# Patient Record
Sex: FEMALE | Employment: OTHER | URBAN - METROPOLITAN AREA
[De-identification: names, ages, dates, MRNs, and addresses within clinical notes are randomized per-mention and may not be internally consistent; named-entity substitution may affect disease eponyms.]

---

## 2017-01-13 ENCOUNTER — OFFICE VISIT (OUTPATIENT)
Dept: OBGYN | Facility: CLINIC | Age: 57
End: 2017-01-13
Attending: OBSTETRICS & GYNECOLOGY
Payer: COMMERCIAL

## 2017-01-13 VITALS
BODY MASS INDEX: 27.7 KG/M2 | DIASTOLIC BLOOD PRESSURE: 99 MMHG | HEART RATE: 102 BPM | WEIGHT: 146.7 LBS | HEIGHT: 61 IN | SYSTOLIC BLOOD PRESSURE: 161 MMHG

## 2017-01-13 DIAGNOSIS — D07.1 VIN III (VULVAR INTRAEPITHELIAL NEOPLASIA III): Primary | ICD-10-CM

## 2017-01-13 PROCEDURE — 56821 COLPOSCOPY VULVA W/BIOPSY: CPT

## 2017-01-13 PROCEDURE — 88312 SPECIAL STAINS GROUP 1: CPT | Performed by: OBSTETRICS & GYNECOLOGY

## 2017-01-13 PROCEDURE — 99212 OFFICE O/P EST SF 10 MIN: CPT | Mod: ZF

## 2017-01-13 PROCEDURE — 88342 IMHCHEM/IMCYTCHM 1ST ANTB: CPT | Performed by: OBSTETRICS & GYNECOLOGY

## 2017-01-13 PROCEDURE — 88305 TISSUE EXAM BY PATHOLOGIST: CPT | Performed by: OBSTETRICS & GYNECOLOGY

## 2017-01-13 NOTE — Clinical Note
"2017       RE: Madeleine Allen  [address] [facility] [address]     Dear Colleague,    Thank you for referring your patient, Madeleine Allen, to the [facility] at [facility]. Please see a copy of my visit note below.      Colposcopy Visit/Procedure Note:    Madeleine Allen is an 56 year old P0 who comes in for surveillance of YAZ III.    Menstrual History:  postmenopausal    PAP      NIL   2014  PAP      NIL   2012  PAP      NIL   2011    CTD:   2015 WLE and laser for YAZ III  10/2015 follow up vulvoscopy wnl, patient stated could not be compliant with Aldara  2016 vulvoscopy wnl          History   Smoking status     Current Every Day Smoker -- 1.00 packs/day for 40 years     Types: Cigarettes     Start date: 2016   Smokeless tobacco     Never Used     Comment: planning in april       Allergies as of 2017     (No Known Allergies)        Colposcopy Procedure:    Consent:  Details of the colposcopic procedure were reviewed. Risks, benefits of treatment, and alternate forms of evaluation were discussed.  Patient's questions were elicited and answered.   Written consent was obtained and scanned into medical record.     Verification of Procedure  Just before the procedure begins, through verbal and active participation of team members, I verified:   Initials   Patient Name cat   Patient  cat   Procedure to be performed cat       OBJECTIVE: /99 mmHg  Pulse 102  Ht 1.549 m (5' 1\")  Wt 66.543 kg (146 lb 11.2 oz)  BMI 27.73 kg/m2    Pelvic Exam:  EG/BUS: Normal genital architecture without lesions, erythema or abnormal secretions. Bartholin's, Urethra, Abram's glands are normal. Multiple areas scarred from previous procedures.   Patient identifies white lesion inferior to clitoris 4x4 mm raised, nontender. Regular edges.    Vagina: moist, pink, rugae with creamy, white and odorlesssecretions  Cervix: no " lesions  Rectum:anus normal    PROCEDURE:    Repeat Pap collected? If no, DELETE       Acetic acid and/or Lugol's solution applied to vulva.  Colposcopic exam of the vulva and apex of the vagina was conducted in the usual fashion.     Findings:  Lesion was ACWE     There were no additional lesions seen.    Biopsies were obtained at the lesion and placed in labeled Formalin Jar. Area was infiltrated with 1% lidocaine 2cc and area excised with Addisons and 11 blade scalpel. Bleeding controlled with silver nitrate      Assessment: YAZ 3     Plan:   Biopsy sent  Pap and anal pap at time of vulvoscopy at next visit in 6 months    Biopsies sent to pathology.  Will contact patient with results and recommended follow-up plan.      Patient advised to contact clinic with heavy vaginal bleeding, fever over 101 degrees F, or any other concerns.    Advised that evaluation of sexual contacts is NOT warranted as she can not get the same virus again. Risk of exposure to a NEW virus is possible with partner change.    Verbalized understanding and agreement with plan.    Again, thank you for allowing me to participate in the care of your patient.      Sincerely,    Jackie Galindo MD

## 2017-01-13 NOTE — PROGRESS NOTES
"  Colposcopy Visit/Procedure Note:    Madeleine Allen is an 56 year old P0 who comes in for surveillance of YAZ III.    Menstrual History:  postmenopausal    PAP      NIL   2014  PAP      NIL   2012  PAP      NIL   2011    CTD:   2015 WLE and laser for YAZ III  10/2015 follow up vulvoscopy wnl, patient stated could not be compliant with Aldara  2016 vulvoscopy wnl          History   Smoking status     Current Every Day Smoker -- 1.00 packs/day for 40 years     Types: Cigarettes     Start date: 2016   Smokeless tobacco     Never Used     Comment: planning in april       Allergies as of 2017     (No Known Allergies)        Colposcopy Procedure:    Consent:  Details of the colposcopic procedure were reviewed. Risks, benefits of treatment, and alternate forms of evaluation were discussed.  Patient's questions were elicited and answered.   Written consent was obtained and scanned into medical record.     Verification of Procedure  Just before the procedure begins, through verbal and active participation of team members, I verified:   Initials   Patient Name cat   Patient  cat   Procedure to be performed cat       OBJECTIVE: /99 mmHg  Pulse 102  Ht 1.549 m (5' 1\")  Wt 66.543 kg (146 lb 11.2 oz)  BMI 27.73 kg/m2    Pelvic Exam:  EG/BUS: Normal genital architecture without lesions, erythema or abnormal secretions. Bartholin's, Urethra, [place]'s glands are normal. Multiple areas scarred from previous procedures.   Patient identifies white lesion inferior to clitoris 4x4 mm raised, nontender. Regular edges.    Vagina: moist, pink, rugae with creamy, white and odorlesssecretions  Cervix: no lesions  Rectum:anus normal    PROCEDURE:    Repeat Pap collected? If no, DELETE       Acetic acid and/or Lugol's solution applied to vulva.  Colposcopic exam of the vulva and apex of the vagina was conducted in the usual fashion.     Findings:  Lesion was ACWE     There were no additional lesions " seen.    Biopsies were obtained at the lesion and placed in labeled Formalin Jar. Area was infiltrated with 1% lidocaine 2cc and area excised with Addisons and 11 blade scalpel. Bleeding controlled with silver nitrate      Assessment: YAZ 3     Plan:   Biopsy sent  Pap and anal pap at time of vulvoscopy at next visit in 6 months    Biopsies sent to pathology.  Will contact patient with results and recommended follow-up plan.      Patient advised to contact clinic with heavy vaginal bleeding, fever over 101 degrees F, or any other concerns.    Advised that evaluation of sexual contacts is NOT warranted as she can not get the same virus again. Risk of exposure to a NEW virus is possible with partner change.    Verbalized understanding and agreement with plan.

## 2017-01-18 LAB — COPATH REPORT: NORMAL

## 2017-03-17 ENCOUNTER — TRANSFERRED RECORDS (OUTPATIENT)
Dept: HEALTH INFORMATION MANAGEMENT | Facility: HOSPITAL | Age: 57
End: 2017-03-17

## 2017-03-17 LAB
ALT SERPL-CCNC: 35 U/L (ref 18–65)
AST SERPL-CCNC: 16 U/L (ref 10–30)
CREAT SERPL-MCNC: 0.57 MG/DL (ref 0.7–1.2)
GLUCOSE SERPL-MCNC: 91 MG/DL (ref 60–99)
POTASSIUM SERPL-SCNC: 3.8 MEQ/L (ref 3.5–5.1)
TSH SERPL-ACNC: 1.19 MIU/ML (ref 0.35–4.8)

## 2017-03-19 ENCOUNTER — MYC MEDICAL ADVICE (OUTPATIENT)
Dept: FAMILY MEDICINE | Facility: OTHER | Age: 57
End: 2017-03-19

## 2017-03-24 ENCOUNTER — TRANSFERRED RECORDS (OUTPATIENT)
Dept: HEALTH INFORMATION MANAGEMENT | Facility: HOSPITAL | Age: 57
End: 2017-03-24

## 2017-03-28 DIAGNOSIS — Z12.31 VISIT FOR SCREENING MAMMOGRAM: Primary | ICD-10-CM

## 2017-03-29 DIAGNOSIS — Z87.891 PERSONAL HISTORY OF TOBACCO USE, PRESENTING HAZARDS TO HEALTH: Primary | ICD-10-CM

## 2017-04-15 ENCOUNTER — TRANSFERRED RECORDS (OUTPATIENT)
Dept: HEALTH INFORMATION MANAGEMENT | Facility: HOSPITAL | Age: 57
End: 2017-04-15

## 2017-04-19 ENCOUNTER — HOSPITAL ENCOUNTER (EMERGENCY)
Facility: HOSPITAL | Age: 57
Discharge: HOME | End: 2017-04-19
Attending: EMERGENCY MEDICINE | Admitting: EMERGENCY MEDICINE
Payer: COMMERCIAL

## 2017-04-19 ENCOUNTER — APPOINTMENT (OUTPATIENT)
Dept: ULTRASOUND IMAGING | Facility: HOSPITAL | Age: 57
End: 2017-04-19
Payer: COMMERCIAL

## 2017-04-19 ENCOUNTER — APPOINTMENT (OUTPATIENT)
Dept: NUCLEAR MEDICINE | Facility: HOSPITAL | Age: 57
End: 2017-04-19
Payer: COMMERCIAL

## 2017-04-19 VITALS
DIASTOLIC BLOOD PRESSURE: 79 MMHG | SYSTOLIC BLOOD PRESSURE: 138 MMHG | RESPIRATION RATE: 16 BRPM | TEMPERATURE: 97.9 F | OXYGEN SATURATION: 98 % | HEART RATE: 97 BPM

## 2017-04-19 DIAGNOSIS — R07.89 ATYPICAL CHEST PAIN: ICD-10-CM

## 2017-04-19 DIAGNOSIS — R00.2 PALPITATIONS: ICD-10-CM

## 2017-04-19 LAB
ALBUMIN SERPL-MCNC: 3.8 G/DL (ref 3.4–5)
ALBUMIN UR-MCNC: NEGATIVE MG/DL
ALP SERPL-CCNC: 79 U/L (ref 40–150)
ALT SERPL W P-5'-P-CCNC: 27 U/L (ref 0–50)
ANION GAP SERPL CALCULATED.3IONS-SCNC: 8 MMOL/L (ref 3–14)
ANION GAP SERPL CALCULATED.3IONS-SCNC: 8 MMOL/L (ref 3–14)
APPEARANCE UR: CLEAR
AST SERPL W P-5'-P-CCNC: 21 U/L (ref 0–45)
BACTERIA #/AREA URNS HPF: ABNORMAL /HPF
BASOPHILS # BLD AUTO: 0.1 10E9/L (ref 0–0.2)
BASOPHILS NFR BLD AUTO: 1 %
BILIRUB SERPL-MCNC: 0.3 MG/DL (ref 0.2–1.3)
BILIRUB UR QL STRIP: NEGATIVE
BUN SERPL-MCNC: 15 MG/DL (ref 7–30)
BUN SERPL-MCNC: 15 MG/DL (ref 7–30)
CALCIUM SERPL-MCNC: 9.1 MG/DL (ref 8.5–10.1)
CALCIUM SERPL-MCNC: 9.2 MG/DL (ref 8.5–10.1)
CHLORIDE SERPL-SCNC: 107 MMOL/L (ref 94–109)
CHLORIDE SERPL-SCNC: 107 MMOL/L (ref 94–109)
CO2 SERPL-SCNC: 25 MMOL/L (ref 20–32)
CO2 SERPL-SCNC: 26 MMOL/L (ref 20–32)
COLOR UR AUTO: ABNORMAL
CREAT SERPL-MCNC: 0.56 MG/DL (ref 0.52–1.04)
CREAT SERPL-MCNC: 0.57 MG/DL (ref 0.52–1.04)
CRP SERPL-MCNC: <2.9 MG/L (ref 0–8)
D DIMER PPP DDU-MCNC: 255 NG/ML D-DU (ref 0–300)
DIFFERENTIAL METHOD BLD: NORMAL
EOSINOPHIL # BLD AUTO: 0.1 10E9/L (ref 0–0.7)
EOSINOPHIL NFR BLD AUTO: 2 %
ERYTHROCYTE [DISTWIDTH] IN BLOOD BY AUTOMATED COUNT: 12.2 % (ref 10–15)
ERYTHROCYTE [SEDIMENTATION RATE] IN BLOOD BY WESTERGREN METHOD: 7 MM/H (ref 0–30)
GFR SERPL CREATININE-BSD FRML MDRD: ABNORMAL ML/MIN/1.7M2
GFR SERPL CREATININE-BSD FRML MDRD: NORMAL ML/MIN/1.7M2
GLUCOSE SERPL-MCNC: 101 MG/DL (ref 70–99)
GLUCOSE SERPL-MCNC: 98 MG/DL (ref 70–99)
GLUCOSE UR STRIP-MCNC: NEGATIVE MG/DL
HCT VFR BLD AUTO: 43.1 % (ref 35–47)
HGB BLD-MCNC: 14.8 G/DL (ref 11.7–15.7)
HGB UR QL STRIP: ABNORMAL
IMM GRANULOCYTES # BLD: 0 10E9/L (ref 0–0.4)
IMM GRANULOCYTES NFR BLD: 0.3 %
KETONES UR STRIP-MCNC: NEGATIVE MG/DL
LEUKOCYTE ESTERASE UR QL STRIP: NEGATIVE
LYMPHOCYTES # BLD AUTO: 1.7 10E9/L (ref 0.8–5.3)
LYMPHOCYTES NFR BLD AUTO: 27.4 %
MAGNESIUM SERPL-MCNC: 2 MG/DL (ref 1.6–2.3)
MCH RBC QN AUTO: 32.4 PG (ref 26.5–33)
MCHC RBC AUTO-ENTMCNC: 34.3 G/DL (ref 31.5–36.5)
MCV RBC AUTO: 94 FL (ref 78–100)
MONOCYTES # BLD AUTO: 0.4 10E9/L (ref 0–1.3)
MONOCYTES NFR BLD AUTO: 6.7 %
MUCOUS THREADS #/AREA URNS LPF: PRESENT /LPF
NEUTROPHILS # BLD AUTO: 3.8 10E9/L (ref 1.6–8.3)
NEUTROPHILS NFR BLD AUTO: 62.6 %
NITRATE UR QL: NEGATIVE
NRBC # BLD AUTO: 0 10*3/UL
NRBC BLD AUTO-RTO: 0 /100
NT-PROBNP SERPL-MCNC: 32 PG/ML (ref 0–900)
PH UR STRIP: 7 PH (ref 4.7–8)
PLATELET # BLD AUTO: 265 10E9/L (ref 150–450)
POTASSIUM SERPL-SCNC: 3.9 MMOL/L (ref 3.4–5.3)
POTASSIUM SERPL-SCNC: 3.9 MMOL/L (ref 3.4–5.3)
PROT SERPL-MCNC: 7.4 G/DL (ref 6.8–8.8)
RBC # BLD AUTO: 4.57 10E12/L (ref 3.8–5.2)
RBC #/AREA URNS AUTO: 3 /HPF (ref 0–2)
SODIUM SERPL-SCNC: 140 MMOL/L (ref 133–144)
SODIUM SERPL-SCNC: 141 MMOL/L (ref 133–144)
SP GR UR STRIP: 1 (ref 1–1.03)
T4 FREE SERPL-MCNC: 1.05 NG/DL (ref 0.76–1.46)
TROPONIN I SERPL-MCNC: NORMAL UG/L (ref 0–0.04)
TSH SERPL DL<=0.05 MIU/L-ACNC: 1.65 MU/L (ref 0.4–4)
URN SPEC COLLECT METH UR: ABNORMAL
UROBILINOGEN UR STRIP-MCNC: NORMAL MG/DL (ref 0–2)
WBC # BLD AUTO: 6.1 10E9/L (ref 4–11)
WBC #/AREA URNS AUTO: <1 /HPF (ref 0–2)

## 2017-04-19 PROCEDURE — 81001 URINALYSIS AUTO W/SCOPE: CPT | Performed by: EMERGENCY MEDICINE

## 2017-04-19 PROCEDURE — 93306 TTE W/DOPPLER COMPLETE: CPT | Mod: 26 | Performed by: INTERNAL MEDICINE

## 2017-04-19 PROCEDURE — 99285 EMERGENCY DEPT VISIT HI MDM: CPT | Mod: 25

## 2017-04-19 PROCEDURE — 93306 TTE W/DOPPLER COMPLETE: CPT | Mod: TC

## 2017-04-19 PROCEDURE — 80053 COMPREHEN METABOLIC PANEL: CPT | Performed by: EMERGENCY MEDICINE

## 2017-04-19 PROCEDURE — 84484 ASSAY OF TROPONIN QUANT: CPT | Performed by: FAMILY MEDICINE

## 2017-04-19 PROCEDURE — 84439 ASSAY OF FREE THYROXINE: CPT | Performed by: EMERGENCY MEDICINE

## 2017-04-19 PROCEDURE — 84443 ASSAY THYROID STIM HORMONE: CPT | Performed by: EMERGENCY MEDICINE

## 2017-04-19 PROCEDURE — 83880 ASSAY OF NATRIURETIC PEPTIDE: CPT | Performed by: EMERGENCY MEDICINE

## 2017-04-19 PROCEDURE — 0296T ZZHC  EXT ECG > 48HR TO 21 DAY RCRD W/CONECT INTL RCRD: CPT | Mod: TC

## 2017-04-19 PROCEDURE — 25000132 ZZH RX MED GY IP 250 OP 250 PS 637: Performed by: EMERGENCY MEDICINE

## 2017-04-19 PROCEDURE — 99285 EMERGENCY DEPT VISIT HI MDM: CPT | Performed by: EMERGENCY MEDICINE

## 2017-04-19 PROCEDURE — 93010 ELECTROCARDIOGRAM REPORT: CPT | Performed by: INTERNAL MEDICINE

## 2017-04-19 PROCEDURE — 85025 COMPLETE CBC W/AUTO DIFF WBC: CPT | Performed by: FAMILY MEDICINE

## 2017-04-19 PROCEDURE — 0298T ZZC EXT ECG > 48HR TO 21 DAY REVIEW AND INTERPRETATN: CPT | Performed by: INTERNAL MEDICINE

## 2017-04-19 PROCEDURE — 25000128 H RX IP 250 OP 636: Performed by: EMERGENCY MEDICINE

## 2017-04-19 PROCEDURE — 93005 ELECTROCARDIOGRAM TRACING: CPT | Mod: 59

## 2017-04-19 PROCEDURE — 36415 COLL VENOUS BLD VENIPUNCTURE: CPT | Performed by: FAMILY MEDICINE

## 2017-04-19 PROCEDURE — 83735 ASSAY OF MAGNESIUM: CPT | Performed by: EMERGENCY MEDICINE

## 2017-04-19 PROCEDURE — 71020 ZZHC CHEST TWO VIEWS, FRONT/LAT: CPT | Mod: TC

## 2017-04-19 PROCEDURE — 85652 RBC SED RATE AUTOMATED: CPT | Performed by: EMERGENCY MEDICINE

## 2017-04-19 PROCEDURE — 86140 C-REACTIVE PROTEIN: CPT | Performed by: EMERGENCY MEDICINE

## 2017-04-19 PROCEDURE — 85379 FIBRIN DEGRADATION QUANT: CPT | Performed by: EMERGENCY MEDICINE

## 2017-04-19 RX ORDER — LIDOCAINE 40 MG/G
CREAM TOPICAL
Status: DISCONTINUED | OUTPATIENT
Start: 2017-04-19 | End: 2017-04-19 | Stop reason: HOSPADM

## 2017-04-19 RX ORDER — SODIUM CHLORIDE 9 MG/ML
INJECTION, SOLUTION INTRAVENOUS CONTINUOUS
Status: DISCONTINUED | OUTPATIENT
Start: 2017-04-19 | End: 2017-04-19 | Stop reason: HOSPADM

## 2017-04-19 RX ORDER — ALPRAZOLAM 0.25 MG
0.5 TABLET ORAL ONCE
Status: COMPLETED | OUTPATIENT
Start: 2017-04-19 | End: 2017-04-19

## 2017-04-19 RX ADMIN — SODIUM CHLORIDE: 9 INJECTION, SOLUTION INTRAVENOUS at 08:29

## 2017-04-19 RX ADMIN — ALPRAZOLAM 0.5 MG: 0.25 TABLET ORAL at 08:28

## 2017-04-19 ASSESSMENT — ENCOUNTER SYMPTOMS
PALPITATIONS: 1
ACTIVITY CHANGE: 1
ABDOMINAL PAIN: 1
CONFUSION: 1
NERVOUS/ANXIOUS: 1
CHEST TIGHTNESS: 1
FATIGUE: 1
SHORTNESS OF BREATH: 1
DIARRHEA: 1
COLOR CHANGE: 1
LIGHT-HEADEDNESS: 1

## 2017-04-19 NOTE — ED AVS SNAPSHOT
[facility]    [address] [address]    Phone:  [phone]                                       Madeleine Allen   MRN: [id number]    Department:  [facility]   Date of Visit:  4/19/2017           Patient Information     Date Of Birth          1960        Your diagnoses for this visit were:     Palpitations     Atypical chest pain        You were seen by Chandler Nieto MD.      Follow-up Information     Follow up with Nicole Carbajal NP In 5 days.    Specialties:  Family Practice, Psychiatry    Why:  As needed    Contact information:     [facility]  [address] [address]  [phone]          Discharge Instructions         Arrhythmia    Electrical impulses cause the normal heart to beat 60 to 100 times a minute. These impulses come from a natural pacemaker deep inside the heart muscle. Each impulse causes the heart muscle to contract. This causes the blood to flow through the heart and out to the tissues and organs of your body.  An arrhythmia is a change from the normal speed or pattern of these electrical impulses. This can cause the heart to beat too fast (tachycardia); or too slow (bradycardia); or in an unsteady pattern (irregular rhythm).  Symptoms of arrhythmias  Different people experience arrhythmias differently. Sometimes they may not have symptoms, but just notice a change in their pulse. Symptoms can include:    Fluttering feeling in the chest    Shortness of breath    Chest pain or pressure    Lightheadedness or dizziness    Fainting or almost fainting    Palpitations (the sense that your heart is fluttering or beating fast or hard or irregularly)    Tiredness, fatigue, or weakness  Causes of arrhythmias  Arrhythmias are most often due to heart disease such as:    Coronary artery disease (arteriosclerosis)    Disease of the heart valves    Enlarged heart    High blood pressure    Heart failure  Other causes of  arrhythmia  include:    Certain medicines (such as asthma inhalers and decongestants)    Some herbal supplements    Cardiac stimulant drugs (such as cocaine, amphetamine, diet pills, certain decongestant cold medicines, caffeine, and nicotine)    Excessive alcohol use    Medical conditions such as thyroid disease, anemia, anxiety, and panic disorder  Arrythmias can often be prevented. The cause and type of arrhythmia determines the best treatment. Sometimes your doctor may want to monitor your heart rate over a 24-hour period or longer. This can help identify the cause of your arrhythmia and find the best treatment. This can be done with a Holter monitor, a portable EKG recording device attached by wires to your chest. You can carry this with you as you perform your routine activities during the monitoring period.  Home care  The following guidelines will help you care for yourself at home:  1. Avoid cardiac stimulants (such as cocaine, amphetamine, diet pills, certain decongestant cold medicines, caffeine, and nicotine).  2. If you smoke, stop smoking. Contact your doctor or a local stop-smoking program for help.  3. Tell your doctor about any prescription, over-the-counter, or herbal medicines you take. These may be affecting your heart rhythm.  Follow-up care  Follow up with your health care provider or as advised by our staff. If a Holter monitor has been recommended, contact the cardiologist you have been referred to as soon as you can  the device. Other outpatient tests may also be arranged for you at that time.  Call 911  This is the fastest and safest way to get to the emergency department. The paramedics can also start treatment on the way to the hospital, if needed.  Don't wait until your symptoms are severe to call 911. Other reasons to call 911 besides chest pain include:    Chest, shoulder, arm, neck, or back pain    Shortness of breath    Feeling lightheaded, faint, or dizzy    Rapid heart beat    Slower  "than usual heart rate compared to your normal    Very irregular heartbeat    Angina with weakness, dizziness, heavy sweating, nausea, or vomiting    Extreme drowsiness, or confusion    Weakness of an arm or leg or one side of the face    Difficulty with speech or vision  When to seek medical care  Remember, things are not always like they are on TV. Sometimes it is not so obvious. You may only feel weak or just \"not right.\" If it is not clear or if you have any doubt, call for advice.    Seek help for chest pain, or it feels different from usual, even if your symptoms are mild.    Don't drive yourself. Have someone else drive. If no one can drive you, call 911.    If your doctor has given you medicines to take when you have symptoms, take them, but do not delay getting help while trying to find them.    Don't delay. Fast diagnosis and treatment can prevent or limit the amount of heart damage during a heart attack or stroke.    Don't go to your doctor's office or a clinic because they will not be able to provide all of the testing or treatment needed for this condition.    6077-9010 [employer]. [address] [place] [address]. All rights reserved. This information is not intended as a substitute for professional medical care. Always follow your healthcare professional's instructions.           *CHEST PAIN, UNCERTAIN CAUSE    Based on your exam today, the exact cause of your chest pain is not certain. Your condition does not seem serious at this time, and your pain does not appear to be coming from your heart. However, sometimes the signs of a serious problem take more time to appear. Therefore, watch for the warning signs listed below.  HOME CARE:  1. Rest today and avoid strenuous activity.  2. Take any prescribed medicine as directed.  FOLLOW UP with your doctor in 1-3 days.   GET PROMPT MEDICAL ATTENTION if any of the following occur:    A change in the type of pain: if it feels different, " becomes more severe, lasts longer, or begins to spread into your shoulder, arm, neck, jaw or back    Shortness of breath or increased pain with breathing    Weakness, dizziness, or fainting    Cough with blood or dark colored sputum (phlegm)    Fever over 101  F (38.3  C)    Swelling, pain or redness in one leg    0941-4353 [facility], [address] [place] [address], [place] [address]. All rights reserved. This information is not intended as a substitute for professional medical care. Always follow your healthcare professional's instructions.  Madeleine,   Assuming you learned English in the [employer] back in the day, this note is written.  Of course, I am kidding, since the two of you speak [facility] English.   We have been able to schedule a stress test for tomorrow.  They should call you with details.   The [employer] event monitor for the next 3 days will hopefully  any of your palpitations when the pain and fear overtakes you.  Claudio the time and how long it lasted so it can be compared with the computer printout.  The echocardiogram (ultrasound) of your heart will give us some good info about your valves, ventricular wall thickiness and flow patterns through your heart.  All of these 3 tests can be gone over with Tracy Blankenship next week and she can assist on getting you a cardiology referral if the testing results point to something that needs attention.  Nice meeting you ladies.  You should do well--good luck with this so that you can enjoy the spring and summer.       Future Appointments        Provider Department Dept Phone Center    4/19/2017 11:30 AM [facility] Nuclear Medicine [phone] [facility]    4/19/2017 12:00 PM [facility] Ultrasound 3 [facility] Ultrasound [phone] [facility]      ED Discharge Orders     NM Lexiscan stress test       The type of stress to be performed (pharmacologic or physical) will be at the discretion of the supervising physician as per department  protocol.                     Review of your medicines      Our records show that you are taking the medicines listed below. If these are incorrect, please call your family doctor or clinic.        Dose / Directions Last dose taken    BUPROPION HCL PO   Dose:  75 mg        Take 75 mg by mouth daily   Refills:  0        PRILOSEC PO   Dose:  20 mg        20 mg   Refills:  0        sertraline 50 MG tablet   Commonly known as:  ZOLOFT   Dose:  75 mg   Quantity:  135 tablet        Take 1.5 tablets (75 mg) by mouth daily Further refills after upcoming appointment on 4/29/16.   Refills:  3        VITAMIN C PO        daily.   Refills:  0        VITAMIN D (CHOLECALCIFEROL) PO   Dose:  99697 Units        Take 10,000 Units by mouth   Refills:  0        ZINC PO        daily.   Refills:  0                Procedures and tests performed during your visit     Basic metabolic panel    CBC with platelets differential    CRP inflammation    Cardiac Continuous Monitoring    Comprehensive metabolic panel    D-Dimer ([facility])    EKG 12 lead    EKG 12-lead, tracing only    Echocardiogram    Erythrocyte sedimentation rate auto    Magnesium    Nt probnp inpatient (BNP)    Peripheral IV catheter    Peripheral IV: Standard    Pulse oximetry nursing    T4 free    TSH    Troponin I    UA with Microscopic    XR Chest 2 Views      Orders Needing Specimen Collection     None      Pending Results     Date and Time Order Name Status Description    4/19/2017 0959 Echocardiogram In process             Pending Culture Results     No orders found from 4/17/2017 to 4/20/2017.            Thank you for choosing [facility]       Thank you for choosing [facility] for your care. Our goal is always to provide you with excellent care. Hearing back from our patients is one way we can continue to improve our services. Please take a few minutes to complete the written survey that you may receive in the mail after you visit with us. Thank you!        Fred  Information     [employer] gives you secure access to your electronic health record. If you see a primary care provider, you can also send messages to your care team and make appointments. If you have questions, please call your primary care clinic.  If you do not have a primary care provider, please call [phone] and they will assist you.        Care EveryWhere ID     This is your Care EveryWhere ID. This could be used by other organizations to access your [facility] medical records  [id number]        After Visit Summary       This is your record. Keep this with you and show to your community pharmacist(s) and doctor(s) at your next visit.

## 2017-04-19 NOTE — ED AVS SNAPSHOT
[facility]    [address] [address]    Phone:  [phone]                                       Madeleine Allen   MRN: [id number]    Department:  [facility]   Date of Visit:  4/19/2017           After Visit Summary Signature Page     I have received my discharge instructions, and my questions have been answered. I have discussed any challenges I see with this plan with the nurse or doctor.    ..........................................................................................................................................  Patient/Patient Representative Signature      ..........................................................................................................................................  Patient Representative Print Name and Relationship to Patient    ..................................................               ................................................  Date                                            Time    ..........................................................................................................................................  Reviewed by Signature/Title    ...................................................              ..............................................  Date                                                            Time

## 2017-04-19 NOTE — ED PROVIDER NOTES
History     Chief Complaint   Patient presents with     Palpitations     HPI  Madeleine Allen is a 56 year old female who has has episodes of palpitations, chest pain, and secondary anxiety in the past few weeks.  She has not talked to her reg provider Nicole Blankenship about this but did finally go to the [facility] ED on Sat April 15 and had a thorough rule out workup for her cardiac symptoms and apparently had none while in the ED but she did have some seeming staring, amnesia like episode that prompted a heat CT, brain MRI/MRA and brain and neck all of which were negative.  She has continued to have these brief palpitations since then with particularly persistent and scary spell this AM, so her twin sister brought her in from her home in [place].  She has been feeling fatigued and weak and just not well for the last few months.  Sounds like she has been to cardiology at the [facility], [facility] and [place] amongst others for a variety of problems.  Her Care Everywhere record pointe to GERD/HH, tobacco dependence, major depression, and currently vulvar neoplasia.    I have reviewed the Medications, Allergies, Past Medical and Surgical History, and Social History in the Epic system.    Review of Systems   Constitutional: Positive for activity change and fatigue.   HENT: Negative.    Respiratory: Positive for chest tightness and shortness of breath.    Cardiovascular: Positive for chest pain and palpitations.   Gastrointestinal: Positive for abdominal pain and diarrhea.   Genitourinary: Positive for pelvic pain.   Skin: Positive for color change.   Neurological: Positive for light-headedness.   Psychiatric/Behavioral: Positive for confusion. The patient is nervous/anxious.    All other systems reviewed and are negative.      Physical Exam   BP: 157/99  Pulse: 97  Temp: 98.6  F (37  C)  Resp: 20  SpO2: 98 %  Physical Exam   Constitutional: She is oriented to person, place, and time. She appears well-developed and  well-nourished. No distress.   Flamboyant zz blonde with extreme tan and optic accents appearing anxious/worried.    HENT:   Head: Normocephalic and atraumatic.   Nose: Nose normal.   Mouth/Throat: Oropharynx is clear and moist. No oropharyngeal exudate.   Eyes: Conjunctivae and EOM are normal. Pupils are equal, round, and reactive to light.   Neck: Normal range of motion. Neck supple. No JVD present. No tracheal deviation present. No thyromegaly present.   Cardiovascular: Normal rate, regular rhythm and intact distal pulses.    No murmur heard.  Pulmonary/Chest: Effort normal and breath sounds normal. No respiratory distress. She has no wheezes. She has no rales. She exhibits no tenderness.   Breast implants   Abdominal: Soft. Bowel sounds are normal. She exhibits no distension. There is no tenderness. There is no rebound and no guarding.   Musculoskeletal: Normal range of motion. She exhibits no edema or deformity.   Neurological: She is alert and oriented to person, place, and time.   Skin: Skin is warm and dry. She is not diaphoretic.   4+ tanned      ED Course     ED Course     Procedures  ECG  Normal sinus rhythm, incomplete RBBB, borderline ECG  QTc 419 ms  Critical Care time:  none    Labs Ordered and Resulted from Time of ED Arrival Up to the Time of Departure from the ED   BASIC METABOLIC PANEL - Abnormal; Notable for the following:        Result Value    Glucose 101 (*)     All other components within normal limits   ROUTINE UA WITH MICROSCOPIC - Abnormal; Notable for the following:     Blood Urine Trace (*)     RBC Urine 3 (*)     Bacteria Urine Few (*)     Mucous Urine Present (*)     All other components within normal limits   CBC WITH PLATELETS DIFFERENTIAL   TROPONIN I   COMPREHENSIVE METABOLIC PANEL   CRP INFLAMMATION   D-DIMER ([facility])   ERYTHROCYTE SEDIMENTATION RATE AUTO   MAGNESIUM   NT PROBNP INPATIENT   T4 FREE   TSH   PERIPHERAL IV CATHETER   CARDIAC CONTINUOUS MONITORING   PULSE OXIMETRY  NURSING   PERIPHERAL IV CATHETER     Assessments & Plan (with Medical Decision Making)   Madeleine present ostensibly with palpitation/tachycardia sensation, chest pain, and secondary anxiety.  This happened again this AM but was not present while in the ED.  She had multiple other complaints including episode of confusion, abdominal pains, and concern about her thyroid.  Reviewed Care [facility]where from [facility] 4 days ago revealing negative workup.  She is concerned that bupropion prescribed by [facility]'s internist has triggered this.  All labs done here today were negative.  Xanax 0.5mg po given empirically.  Decided to complete noninvasive workup of her heart with echo, placement of zios 72 hours monitor, and scheduled stress test for AM.  Of note there was a 4 beat sequence of different axis rhythm but at the same rate of which she was unaware and may be relevant.  After all these are obtained, she is advised to see [place] Blankenship for reports early next week.   I have reviewed the nursing notes.    I have reviewed the findings, diagnosis, plan and need for follow up with the patient.    Discharge Medication List as of 4/19/2017 11:25 AM          Final diagnoses:   Palpitations   Atypical chest pain       4/19/2017   [facility]     Chandler Nieto MD  04/19/17 1953

## 2017-04-19 NOTE — ED NOTES
Patient ready for discharge. Checking on Stress test appointment details. Patient has the holter [employer]io monitor attached with instructions. Denies chest pain or palpitations at this time.

## 2017-04-19 NOTE — DISCHARGE INSTRUCTIONS
Arrhythmia    Electrical impulses cause the normal heart to beat 60 to 100 times a minute. These impulses come from a natural pacemaker deep inside the heart muscle. Each impulse causes the heart muscle to contract. This causes the blood to flow through the heart and out to the tissues and organs of your body.  An arrhythmia is a change from the normal speed or pattern of these electrical impulses. This can cause the heart to beat too fast (tachycardia); or too slow (bradycardia); or in an unsteady pattern (irregular rhythm).  Symptoms of arrhythmias  Different people experience arrhythmias differently. Sometimes they may not have symptoms, but just notice a change in their pulse. Symptoms can include:    Fluttering feeling in the chest    Shortness of breath    Chest pain or pressure    Lightheadedness or dizziness    Fainting or almost fainting    Palpitations (the sense that your heart is fluttering or beating fast or hard or irregularly)    Tiredness, fatigue, or weakness  Causes of arrhythmias  Arrhythmias are most often due to heart disease such as:    Coronary artery disease (arteriosclerosis)    Disease of the heart valves    Enlarged heart    High blood pressure    Heart failure  Other causes of  arrhythmia include:    Certain medicines (such as asthma inhalers and decongestants)    Some herbal supplements    Cardiac stimulant drugs (such as cocaine, amphetamine, diet pills, certain decongestant cold medicines, caffeine, and nicotine)    Excessive alcohol use    Medical conditions such as thyroid disease, anemia, anxiety, and panic disorder  Arrythmias can often be prevented. The cause and type of arrhythmia determines the best treatment. Sometimes your doctor may want to monitor your heart rate over a 24-hour period or longer. This can help identify the cause of your arrhythmia and find the best treatment. This can be done with a Holter monitor, a portable EKG recording device attached by wires to your  "chest. You can carry this with you as you perform your routine activities during the monitoring period.  Home care  The following guidelines will help you care for yourself at home:  1. Avoid cardiac stimulants (such as cocaine, amphetamine, diet pills, certain decongestant cold medicines, caffeine, and nicotine).  2. If you smoke, stop smoking. Contact your doctor or a local stop-smoking program for help.  3. Tell your doctor about any prescription, over-the-counter, or herbal medicines you take. These may be affecting your heart rhythm.  Follow-up care  Follow up with your health care provider or as advised by our staff. If a Holter monitor has been recommended, contact the cardiologist you have been referred to as soon as you can  the device. Other outpatient tests may also be arranged for you at that time.  Call 911  This is the fastest and safest way to get to the emergency department. The paramedics can also start treatment on the way to the hospital, if needed.  Don't wait until your symptoms are severe to call 911. Other reasons to call 911 besides chest pain include:    Chest, shoulder, arm, neck, or back pain    Shortness of breath    Feeling lightheaded, faint, or dizzy    Rapid heart beat    Slower than usual heart rate compared to your normal    Very irregular heartbeat    Angina with weakness, dizziness, heavy sweating, nausea, or vomiting    Extreme drowsiness, or confusion    Weakness of an arm or leg or one side of the face    Difficulty with speech or vision  When to seek medical care  Remember, things are not always like they are on TV. Sometimes it is not so obvious. You may only feel weak or just \"not right.\" If it is not clear or if you have any doubt, call for advice.    Seek help for chest pain, or it feels different from usual, even if your symptoms are mild.    Don't drive yourself. Have someone else drive. If no one can drive you, call 911.    If your doctor has given you medicines " to take when you have symptoms, take them, but do not delay getting help while trying to find them.    Don't delay. Fast diagnosis and treatment can prevent or limit the amount of heart damage during a heart attack or stroke.    Don't go to your doctor's office or a clinic because they will not be able to provide all of the testing or treatment needed for this condition.    3794-1094 [employer]. [address] [place] [address]. All rights reserved. This information is not intended as a substitute for professional medical care. Always follow your healthcare professional's instructions.           *CHEST PAIN, UNCERTAIN CAUSE    Based on your exam today, the exact cause of your chest pain is not certain. Your condition does not seem serious at this time, and your pain does not appear to be coming from your heart. However, sometimes the signs of a serious problem take more time to appear. Therefore, watch for the warning signs listed below.  HOME CARE:  1. Rest today and avoid strenuous activity.  2. Take any prescribed medicine as directed.  FOLLOW UP with your doctor in 1-3 days.   GET PROMPT MEDICAL ATTENTION if any of the following occur:    A change in the type of pain: if it feels different, becomes more severe, lasts longer, or begins to spread into your shoulder, arm, neck, jaw or back    Shortness of breath or increased pain with breathing    Weakness, dizziness, or fainting    Cough with blood or dark colored sputum (phlegm)    Fever over 101  F (38.3  C)    Swelling, pain or redness in one leg    6429-0011 [employer], [address] [place] [address]. All rights reserved. This information is not intended as a substitute for professional medical care. Always follow your healthcare professional's instructions.  Madeleine,   Assuming you learned English in the [employer] schools back in the day, this note is written.  Of course, I am kidding, since the two of you speak  perfect [facility] English.   We have been able to schedule a stress test for tomorrow.  They should call you with details.   The [employer] event monitor for the next 3 days will hopefully  any of your palpitations when the pain and fear overtakes you.  Claudio the time and how long it lasted so it can be compared with the computer printout.  The echocardiogram (ultrasound) of your heart will give us some good info about your valves, ventricular wall thickiness and flow patterns through your heart.  All of these 3 tests can be gone over with Tracy Blankenship next week and she can assist on getting you a cardiology referral if the testing results point to something that needs attention.  Nice meeting you ladies.  You should do well--good luck with this so that you can enjoy the spring and summer.

## 2017-04-19 NOTE — ED NOTES
Patient given paper instructions for the treadmill stress test which were provided by the Radiology department, test scheduled for 0700 tomorrow.

## 2017-04-19 NOTE — ED NOTES
"Pt had episode of \"palpitations\" while nurse in room. Rhythm strip printed and placed in door. Run of 4 beats of v-tach noted.  "

## 2017-04-20 NOTE — PROGRESS NOTES
Pt seen in ER on 4/20/17 for palpitations, tachycardia, chest pain, and secondary anxiety. Pt was treated with Xanax empirically and was advised to follow up with PCP in clinic next week. Echocardiogram was performed and was forwarded to PCP for review/continuation of care.

## 2017-04-26 DIAGNOSIS — Z12.31 VISIT FOR SCREENING MAMMOGRAM: Primary | ICD-10-CM

## 2017-05-23 ENCOUNTER — OFFICE VISIT (OUTPATIENT)
Dept: SURGERY | Facility: OTHER | Age: 57
End: 2017-05-23
Attending: SURGERY
Payer: COMMERCIAL

## 2017-05-23 VITALS
WEIGHT: 146 LBS | DIASTOLIC BLOOD PRESSURE: 72 MMHG | HEIGHT: 62 IN | BODY MASS INDEX: 26.87 KG/M2 | SYSTOLIC BLOOD PRESSURE: 130 MMHG | HEART RATE: 62 BPM | RESPIRATION RATE: 16 BRPM | TEMPERATURE: 98.4 F | OXYGEN SATURATION: 97 %

## 2017-05-23 DIAGNOSIS — Z12.11 ENCOUNTER FOR SCREENING COLONOSCOPY: Primary | ICD-10-CM

## 2017-05-23 PROCEDURE — 99213 OFFICE O/P EST LOW 20 MIN: CPT

## 2017-05-23 PROCEDURE — 99203 OFFICE O/P NEW LOW 30 MIN: CPT | Performed by: SURGERY

## 2017-05-23 ASSESSMENT — PAIN SCALES - GENERAL: PAINLEVEL: NO PAIN (0)

## 2017-05-23 NOTE — NURSING NOTE
"Chief Complaint   Patient presents with     Consult     Colon and EGD consult, [facility] referring.        Initial /72  Pulse 62  Temp 98.4  F (36.9  C) (Tympanic)  Resp 16  Ht 5' 2\" (1.575 m)  Wt 146 lb (66.2 kg)  SpO2 97%  BMI 26.7 kg/m2 Estimated body mass index is 26.7 kg/(m^2) as calculated from the following:    Height as of this encounter: 5' 2\" (1.575 m).    Weight as of this encounter: 146 lb (66.2 kg).  Medication Reconciliation: complete   Sabrina Hadley      "

## 2017-05-23 NOTE — MR AVS SNAPSHOT
After Visit Summary   5/23/2017    Juanito Byrd    MRN: [id number]           Patient Information     Date Of Birth          1960        Visit Information        Provider Department      5/23/2017 10:30 AM Lucina Juarez MD [facility]        Care Instructions    Thank you for allowing Dr. Juarez and our surgical team to participate in your care.  If you have a scheduling or an appointment question please contact [facility] Unit Coordinator at her direct line [phone].   ALL nursing questions or concerns can be directed to Cristina at: [phone]-Cristina    We have ordered a cologuard test.  [employer] will be contacting you to set this up.  Call the surgery nurse (Cristina) with any questions or if you have not heard from the company within a week.    Thank you!              Follow-ups after your visit        Who to contact     If you have questions or need follow up information about today's clinic visit or your schedule please contact [facility] directly at [phone].  Normal or non-critical lab and imaging results will be communicated to you by [employer], letter or phone within 4 business days after the clinic has received the results. If you do not hear from us within 7 days, please contact the clinic through [employer] or phone. If you have a critical or abnormal lab result, we will notify you by phone as soon as possible.  Submit refill requests through [employer] or call your pharmacy and they will forward the refill request to us. Please allow 3 business days for your refill to be completed.          Additional Information About Your Visit        [employer] Information     [employer] gives you secure access to your electronic health record. If you see a primary care provider, you can also send messages to your care team and make appointments. If you have questions, please call your primary care clinic.  If you do not have a primary care  "provider, please call [phone] and they will assist you.        Care EveryWhere ID     This is your Care EveryWhere ID. This could be used by other organizations to access your [place] medical records  [id number]        Your Vitals Were     Pulse Temperature Respirations Height Pulse Oximetry BMI (Body Mass Index)    62 98.4  F (36.9  C) (Tympanic) 16 5' 2\" (1.575 m) 97% 26.7 kg/m2       Blood Pressure from Last 3 Encounters:   05/23/17 130/72   04/29/16 158/89   10/09/15 160/89    Weight from Last 3 Encounters:   05/23/17 146 lb (66.2 kg)   04/29/16 159 lb 9.6 oz (72.4 kg)   10/06/15 160 lb (72.6 kg)              Today, you had the following     No orders found for display         Today's Medication Changes          These changes are accurate as of: 5/23/17 11:59 PM.  If you have any questions, ask your nurse or doctor.               Stop taking these medicines if you haven't already. Please contact your care team if you have questions.     ascorbic acid 1000 MG Tabs   Commonly known as:  vitamin C   Stopped by:  Lucina Juarez MD           aspirin 81 MG tablet   Stopped by:  Lucina Juarez MD           B Complex-B12 Tabs   Stopped by:  Lucina Juarez MD           biotin 1000 MCG Tabs tablet   Stopped by:  Lucina Juarez MD           calcium-magnesium 500-250 MG Tabs per tablet   Commonly known as:  CALMAG   Stopped by:  Lucina Juarez MD           cholecalciferol 1000 UNIT tablet   Commonly known as:  vitamin D   Stopped by:  Lucina Juarez MD           folic acid 400 MCG tablet   Commonly known as:  FOLVITE   Stopped by:  Lucina Juarez MD           Magnesium-Chelated Zinc 133.33-5 MG Tabs   Stopped by:  Lucina Juarez MD           Omega-3 Fish Oil/Vitamin D3 4276-6001 MG-UNIT Caps   Stopped by:  Lucina Juarez MD           pyridoxine 100 MG tablet   Commonly known as:  VITAMIN B-6   Stopped by:  Lucina Juarez MD           vitamin E 400 UNIT capsule   Stopped by:  Larry" Lucina WALDEN MD           Zinc 50 MG Caps   Stopped by:  Lucina Juarez MD                    Primary Care Provider Office Phone # Fax #    Tawny Valente [phone]       [facility] [address] [facility] [address]        Thank you!     Thank you for choosing [facility]  for your care. Our goal is always to provide you with excellent care. Hearing back from our patients is one way we can continue to improve our services. Please take a few minutes to complete the written survey that you may receive in the mail after your visit with us. Thank you!             Your Updated Medication List - Protect others around you: Learn how to safely use, store and throw away your medicines at www.disposemymeds.org.          This list is accurate as of: 5/23/17 11:59 PM.  Always use your most recent med list.                   Brand Name Dispense Instructions for use    chlorthalidone 25 MG tablet    HYGROTON     Take 25 mg by mouth daily       PROVIGIL PO      Take 200 mg by mouth States takes a bite off of it daily as a whole tablet is too much.       sertraline 50 MG tablet    ZOLOFT     Take 50 mg by mouth daily

## 2017-05-23 NOTE — PATIENT INSTRUCTIONS
Thank you for allowing Dr. Juarez and our surgical team to participate in your care.  If you have a scheduling or an appointment question please contact [facility] Health Unit Coordinator at her direct line [phone].   ALL nursing questions or concerns can be directed to Cristina at: [phone]-Cristina    We have ordered a cologuard test.  [employer] will be contacting you to set this up.  Call the surgery nurse (Cristina) with any questions or if you have not heard from the company within a week.    Thank you!

## 2017-06-01 NOTE — PROGRESS NOTES
[facility] Surgery Consultation    CC:   Colon cancer screening,   Esophagogastroduodenoscopy consideration      HPI:  This 56 year old year old female is seen at the request of Dr. Valente for evaluation and scheduling of upper and lower endoscopy.  She does not wish to undergo colonoscopic colon cancer screening as she knows of individuals who have suffered significant complications.  She has read about alternative methods of colon cancer screening and asks about fecal occult blood testing and DNA testing.  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      Allergies   Allergen Reactions     Duloxetine Hydrochloride Palpitations     Cymbalta      Escitalopram Oxalate Palpitations     Lexapro     Venlafaxine Hcl Palpitations     Effexor     HABITS:    Social History   Substance Use Topics     Smoking status: Current Every Day Smoker     Packs/day: 1.00     Types: Cigarettes     Smokeless tobacco: Never Used     Alcohol use 0.6 - 3.0 oz/week     1 - 5 Standard drinks or equivalent per week       Family History   Problem Relation Age of Onset     Neurologic  Disorder Mother      ALS     Coronary Artery Disease Mother      Hypertension Mother      Hyperlipidemia Mother      Thyroid Disease Mother      Ovarian Cancer Sister      s/p TAHBSO, clear     Gynecology Sister      MARCOS III     CANCER Brother      lung     DIABETES No family hx of      Asthma No family hx of        REVIEW OF SYSTEMS:  Ten point review of systems negative except those mentioned in the HPI.     OBJECTIVE:      GENERAL: Generally appears well, in no distress with appropriate affect.  HEENT:   Sclerae anicteric -   Extremities:  Extremities normal. No deformities, edema, or skin discoloration.  Skin:  no suspicious lesions or rashes  Neurological: grossly intact    Psych:  Alert, oriented, affect appropriate with normal decision making ability.    IMPRESSION:  Colon cancer screening, refuses colonoscopy as fears risk over benefit.   The methods, sensitivity and specificity of the variety of colon screening methods were discussed in exhaustive detail.  She was advised that the gold standard remains colonoscopy but that fecal DNA testing with cologuard is being recognized as of value.  She accepts the lower sensitivity of this procedure with the understanding that a positive result dictates colonsocopy for evaluation.  At this time, she wishes to defer consideration of upper gastrointestinal endoscopy.    PLAN:  Cologuard order entered.  She was asked to contact the office if testing materials not received in 2 weeks.    The contact time exceeded 60 minutes with >70% spent in the consultative aspect, outlining disease, risk, technical aspects of endoscopy, sensitivity, specificity and also outlining fecal occult blood and DNA testing.  Her questions were asked and answered to her satisfaction and she is firm in her wish to not undergo intervention.    Lucina Juarez MD, FACS    6/1/2017  12:00 PM    cc:  Tawny Valente NP

## 2017-06-13 NOTE — PROGRESS NOTES
"[facility] Surgery Consultation    CC:  Evaluation for upper and lower gastrointestinal endoscopy    HPI:  This 56 year old year old female is seen at the request of Tawny Valente NP at [facility] for evaluation and scheduling of EGD and Colonoscopy.  The patient states that she had minor symptoms of reflux which have resolved and she is not convinced that she needs esophagogastroduodenoscopy.  Her sister mentioned barretts and this prompted discussion and referral by the patient's primary care provider.  The patient herself states that all symptoms have resolved and she is not inclined to proceed at this time.  In addition, she is due for colon cancer screening but is mortified about the potential of complications knowing personally of individuals who have suffered complications including perforation, need for laparotomy and in one instance, fecal diversion.  She would prefer an alternative method and questions the \"little blue man in the box\" which refers to the television advertising presently playing informing the public of the method of cologuard testing.      Colonoscopy as the gold standard was discussed but the patient is willing to accept risk of false negative results.  However, she agrees that if the test does show DNA suggesting adenomatous polyp or neoplasia, she will consent to colonoscopy.  She is asymptomatic and without family history.    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      Allergies   Allergen Reactions     Duloxetine Hydrochloride Palpitations     Cymbalta      Escitalopram Oxalate Palpitations     Lexapro     Venlafaxine Hcl Palpitations     Effexor     HABITS:    Social History   Substance Use Topics     Smoking status: Current Every Day Smoker     Packs/day: 1.00     Types: Cigarettes     Smokeless tobacco: Never Used     Alcohol use 0.6 - 3.0 oz/week     1 - 5 Standard drinks or equivalent per week       Family History   Problem Relation Age of Onset     Neurologic Disorder Mother      ALS     Coronary Artery Disease Mother      Hypertension Mother      Hyperlipidemia Mother      Thyroid Disease Mother      Ovarian Cancer Sister      s/p TAHBSO, clear     Gynecology Sister      MARCOS III     CANCER Brother      lung     DIABETES No family hx of      Asthma No family hx of        REVIEW OF SYSTEMS:  Ten point review of systems negative except those mentioned in the HPI.     OBJECTIVE:    /72  Pulse 62  Temp 98.4  F (36.9  C) (Tympanic)  Resp 16  Ht 5' 2\" (1.575 m)  Wt 146 lb (66.2 kg)  SpO2 97%  BMI 26.7 kg/m2    GENERAL: Generally appears well, in no distress with appropriate affect.  HEENT:   Sclerae anicteric - No cervical, supra/infraclavciular lymphadenopathy, no thyroid masses  Respiratory:  Lungs clear to ausculation bilaterally with good air excursion  Cardiovascular:  Regular Rate and Rhythm with no murmurs gallops or rubs, normal   :  deferred  Extremities:  Extremities normal. No deformities, edema, or skin discoloration.  Skin:  no suspicious lesions or rashes  Neurological: grossly intact    Psych:  Alert, oriented, affect appropriate with normal decision making ability.    IMPRESSION:  Declines endoscopic evaluation of foregut and/or hindgut. "  She is invited to call the office if she does change her mind.  In the meantime, cologuard testing to screen for colon cancer ordered.    PLAN:  Cologuard order provided.  Return for positive result to schedule colonoscopy.  Reconsider EGD for symptomatic foregut concern    Lucina Juarez MD, FACS  5/23/17        cc:  Tawny Valente NP

## 2017-06-15 ENCOUNTER — TRANSFERRED RECORDS (OUTPATIENT)
Dept: HEALTH INFORMATION MANAGEMENT | Facility: HOSPITAL | Age: 57
End: 2017-06-15

## 2017-07-03 ENCOUNTER — ALLIED HEALTH/NURSE VISIT (OUTPATIENT)
Dept: FAMILY MEDICINE | Facility: OTHER | Age: 57
End: 2017-07-03
Attending: FAMILY MEDICINE
Payer: COMMERCIAL

## 2017-07-03 DIAGNOSIS — Z71.9 COUNSELED BY NURSE: Primary | ICD-10-CM

## 2017-07-03 NOTE — NURSING NOTE
Talked with [facility] and cortisol  needs to be done at early lab 0800. Notified to pt and she will be going out of town and when she comes back she starts work at 0600, she will follow up on Monday at the [facility] when she has an appointment notified pt this lab would also be a send out for our facility.  Pt verbalized understanding and will follow up at [place] with her appointment.   Pamela M Lechevalier LPN

## 2017-07-03 NOTE — MR AVS SNAPSHOT
After Visit Summary   7/3/2017    Madeleine Allen    MRN: [id number]           Patient Information     Date Of Birth          1960        Visit Information        Provider Department      7/3/2017 10:15 AM [facility] NURSE [facility] [place]        Today's Diagnoses     Counseled by nurse    -  1       Follow-ups after your visit        Who to contact     If you have questions or need follow up information about today's clinic visit or your schedule please contact [facility] directly at [phone].  Normal or non-critical lab and imaging results will be communicated to you by [employer], letter or phone within 4 business days after the clinic has received the results. If you do not hear from us within 7 days, please contact the clinic through [employer] or phone. If you have a critical or abnormal lab result, we will notify you by phone as soon as possible.  Submit refill requests through [employer] or call your pharmacy and they will forward the refill request to us. Please allow 3 business days for your refill to be completed.          Additional Information About Your Visit        MyChart Information     [employer] gives you secure access to your electronic health record. If you see a primary care provider, you can also send messages to your care team and make appointments. If you have questions, please call your primary care clinic.  If you do not have a primary care provider, please call [phone] and they will assist you.        Care EveryWhere ID     This is your Care EveryWhere ID. This could be used by other organizations to access your [facility] medical records  [id number]         Blood Pressure from Last 3 Encounters:   04/19/17 138/79   01/13/17 (!) 161/99   10/25/16 138/76    Weight from Last 3 Encounters:   01/13/17 146 lb 11.2 oz (66.5 kg)   10/25/16 147 lb (66.7 kg)   07/13/16 147 lb 12.8 oz (67 kg)              Today, you had the following     No orders found for display        Primary Care Provider Office Phone # Fax #    Nicole CarbajalHECTOR [phone]       [address] [address]        Equal Access to Services     ASPEN MASTERS : Henry lee yaritzao Markie, wajonathonda luqadaha, qaybta kaalmada selene, zhang bah junjordon hayes charley corona. So [facility] [phone].    ATENCIÓN: Si habla español, tiene a casarez disposición servicios gratuitos de asistencia lingüística. Llame al [phone].    We comply with applicable federal civil rights laws and [place] laws. We do not discriminate on the basis of race, color, national origin, age, disability sex, sexual orientation or gender identity.            Thank you!     Thank you for choosing [facility]  for your care. Our goal is always to provide you with excellent care. Hearing back from our patients is one way we can continue to improve our services. Please take a few minutes to complete the written survey that you may receive in the mail after your visit with us. Thank you!             Your Updated Medication List - Protect others around you: Learn how to safely use, store and throw away your medicines at www.disposemymeds.org.          This list is accurate as of: 7/3/17 10:27 AM.  Always use your most recent med list.                   Brand Name Dispense Instructions for use Diagnosis    BUPROPION HCL PO      Take 75 mg by mouth daily        PRILOSEC PO      20 mg        sertraline 50 MG tablet    ZOLOFT    135 tablet    Take 1.5 tablets (75 mg) by mouth daily Further refills after upcoming appointment on 4/29/16.    Depression       VITAMIN C PO      daily.        VITAMIN D (CHOLECALCIFEROL) PO      Take 10,000 Units by mouth        ZINC PO      daily.

## 2017-07-13 ENCOUNTER — TRANSFERRED RECORDS (OUTPATIENT)
Dept: HEALTH INFORMATION MANAGEMENT | Facility: CLINIC | Age: 57
End: 2017-07-13

## 2017-07-13 LAB — COLOGUARD-ABSTRACT: NEGATIVE

## 2017-09-01 ENCOUNTER — HOSPITAL ENCOUNTER (EMERGENCY)
Facility: HOSPITAL | Age: 57
Discharge: HOME | End: 2017-09-01
Attending: PHYSICIAN ASSISTANT | Admitting: PHYSICIAN ASSISTANT
Payer: MEDICARE

## 2017-09-01 ENCOUNTER — TELEPHONE (OUTPATIENT)
Dept: FAMILY MEDICINE | Facility: OTHER | Age: 57
End: 2017-09-01

## 2017-09-01 VITALS
TEMPERATURE: 98.1 F | DIASTOLIC BLOOD PRESSURE: 84 MMHG | SYSTOLIC BLOOD PRESSURE: 125 MMHG | OXYGEN SATURATION: 98 % | RESPIRATION RATE: 18 BRPM | HEART RATE: 73 BPM

## 2017-09-01 DIAGNOSIS — J18.9 PNEUMONIA OF RIGHT LUNG DUE TO INFECTIOUS ORGANISM, UNSPECIFIED PART OF LUNG: ICD-10-CM

## 2017-09-01 LAB
ALBUMIN UR-MCNC: NEGATIVE MG/DL
AMORPH CRY #/AREA URNS HPF: ABNORMAL /HPF
ANION GAP SERPL CALCULATED.3IONS-SCNC: 7 MMOL/L (ref 3–14)
APPEARANCE UR: CLEAR
BACTERIA #/AREA URNS HPF: ABNORMAL /HPF
BASOPHILS # BLD AUTO: 0 10E9/L (ref 0–0.2)
BASOPHILS NFR BLD AUTO: 0.4 %
BILIRUB UR QL STRIP: NEGATIVE
BUN SERPL-MCNC: 9 MG/DL (ref 7–30)
CALCIUM SERPL-MCNC: 8.9 MG/DL (ref 8.5–10.1)
CHLORIDE SERPL-SCNC: 103 MMOL/L (ref 94–109)
CO2 SERPL-SCNC: 29 MMOL/L (ref 20–32)
COLOR UR AUTO: YELLOW
CREAT SERPL-MCNC: 0.63 MG/DL (ref 0.52–1.04)
CRP SERPL-MCNC: 10.9 MG/L (ref 0–8)
DIFFERENTIAL METHOD BLD: NORMAL
EOSINOPHIL # BLD AUTO: 0.2 10E9/L (ref 0–0.7)
EOSINOPHIL NFR BLD AUTO: 1.7 %
ERYTHROCYTE [DISTWIDTH] IN BLOOD BY AUTOMATED COUNT: 12.8 % (ref 10–15)
ERYTHROCYTE [SEDIMENTATION RATE] IN BLOOD BY WESTERGREN METHOD: 19 MM/H (ref 0–30)
GFR SERPL CREATININE-BSD FRML MDRD: >90 ML/MIN/1.7M2
GLUCOSE SERPL-MCNC: 97 MG/DL (ref 70–99)
GLUCOSE UR STRIP-MCNC: NEGATIVE MG/DL
HCT VFR BLD AUTO: 41.9 % (ref 35–47)
HGB BLD-MCNC: 14.7 G/DL (ref 11.7–15.7)
HGB UR QL STRIP: ABNORMAL
IMM GRANULOCYTES # BLD: 0 10E9/L (ref 0–0.4)
IMM GRANULOCYTES NFR BLD: 0.2 %
KETONES UR STRIP-MCNC: NEGATIVE MG/DL
LEUKOCYTE ESTERASE UR QL STRIP: NEGATIVE
LYMPHOCYTES # BLD AUTO: 3 10E9/L (ref 0.8–5.3)
LYMPHOCYTES NFR BLD AUTO: 32.6 %
MCH RBC QN AUTO: 32 PG (ref 26.5–33)
MCHC RBC AUTO-ENTMCNC: 35.1 G/DL (ref 31.5–36.5)
MCV RBC AUTO: 91 FL (ref 78–100)
MONOCYTES # BLD AUTO: 0.6 10E9/L (ref 0–1.3)
MONOCYTES NFR BLD AUTO: 6.1 %
NEUTROPHILS # BLD AUTO: 5.4 10E9/L (ref 1.6–8.3)
NEUTROPHILS NFR BLD AUTO: 59 %
NITRATE UR QL: NEGATIVE
NRBC # BLD AUTO: 0 10*3/UL
NRBC BLD AUTO-RTO: 0 /100
PH UR STRIP: 7.5 PH (ref 4.7–8)
PLATELET # BLD AUTO: 250 10E9/L (ref 150–450)
POTASSIUM SERPL-SCNC: 3.2 MMOL/L (ref 3.4–5.3)
RBC # BLD AUTO: 4.6 10E12/L (ref 3.8–5.2)
RBC #/AREA URNS AUTO: 3 /HPF (ref 0–2)
SODIUM SERPL-SCNC: 139 MMOL/L (ref 133–144)
SOURCE: ABNORMAL
SP GR UR STRIP: 1.01 (ref 1–1.03)
TSH SERPL DL<=0.005 MIU/L-ACNC: 0.99 MU/L (ref 0.4–4)
UROBILINOGEN UR STRIP-MCNC: NORMAL MG/DL (ref 0–2)
WBC # BLD AUTO: 9.1 10E9/L (ref 4–11)
WBC #/AREA URNS AUTO: <1 /HPF (ref 0–2)

## 2017-09-01 PROCEDURE — 25000128 H RX IP 250 OP 636: Performed by: PHYSICIAN ASSISTANT

## 2017-09-01 PROCEDURE — 71020 ZZHC CHEST TWO VIEWS, FRONT/LAT: CPT | Mod: TC

## 2017-09-01 PROCEDURE — 86140 C-REACTIVE PROTEIN: CPT | Performed by: PHYSICIAN ASSISTANT

## 2017-09-01 PROCEDURE — 96360 HYDRATION IV INFUSION INIT: CPT

## 2017-09-01 PROCEDURE — 99284 EMERGENCY DEPT VISIT MOD MDM: CPT | Mod: 25

## 2017-09-01 PROCEDURE — 87798 DETECT AGENT NOS DNA AMP: CPT

## 2017-09-01 PROCEDURE — 85652 RBC SED RATE AUTOMATED: CPT | Performed by: PHYSICIAN ASSISTANT

## 2017-09-01 PROCEDURE — 81001 URINALYSIS AUTO W/SCOPE: CPT | Performed by: PHYSICIAN ASSISTANT

## 2017-09-01 PROCEDURE — 80048 BASIC METABOLIC PNL TOTAL CA: CPT | Performed by: PHYSICIAN ASSISTANT

## 2017-09-01 PROCEDURE — 85025 COMPLETE CBC W/AUTO DIFF WBC: CPT | Performed by: PHYSICIAN ASSISTANT

## 2017-09-01 PROCEDURE — 84443 ASSAY THYROID STIM HORMONE: CPT | Performed by: PHYSICIAN ASSISTANT

## 2017-09-01 PROCEDURE — 99284 EMERGENCY DEPT VISIT MOD MDM: CPT | Performed by: PHYSICIAN ASSISTANT

## 2017-09-01 PROCEDURE — 86618 LYME DISEASE ANTIBODY: CPT | Performed by: PHYSICIAN ASSISTANT

## 2017-09-01 PROCEDURE — 36415 COLL VENOUS BLD VENIPUNCTURE: CPT | Performed by: PHYSICIAN ASSISTANT

## 2017-09-01 RX ORDER — DOXYCYCLINE 100 MG/1
100 CAPSULE ORAL 2 TIMES DAILY
Qty: 14 CAPSULE | Refills: 0 | Status: SHIPPED | OUTPATIENT
Start: 2017-09-01 | End: 2017-09-08

## 2017-09-01 RX ADMIN — SODIUM CHLORIDE 1000 ML: 9 INJECTION, SOLUTION INTRAVENOUS at 17:13

## 2017-09-01 NOTE — ED AVS SNAPSHOT
" [facility]    [address]    [facility] [address]    Phone:  [phone]                                       Juanito Byrd   MRN: [id number]    Department:  [facility]   Date of Visit:  9/1/2017           Patient Information     Date Of Birth          1960        Your diagnoses for this visit were:     Pneumonia of right lung due to infectious organism, unspecified part of lung        You were seen by Samuel Tatum PA.      Follow-up Information     Follow up with None In 1 week.        Follow up with [facility].    Specialty:  EMERGENCY MEDICINE    Why:  If symptoms worsen    Contact information:    [address]  [place] [address] [phone]    Additional information:    From [facility]: Take [address]. Turn left at [address]. Turn left at the first stoplight on [address]. At the first stop sign, take a right onto [address]. Take a left into the parking lot and continue through until you reach the North enterance of the building.       From [place]: Take [address]. Take the [address] ramp towards [place]. Turn left onto [address]. Take a slight right onto [address]. Turn left at the first stoplight on [address]. At the first stop sign, take a right onto [address]. Take a left into the parking lot and continue through until you reach the North enterance of the building.       From [place]: Take [address]. Take a right at [address]. At the first stop sign, take a right onto [address]. Take a left into the parking lot and continue through until you reach the North enterance of the building.         Discharge Instructions       1. Doxycycline for 7 days to cover pneumonia/lower respiratory infection  2. Eat yogurt, kefir or take over-the-counter \"probiotic\" at least 2 hours before or after a dose of antibiotic. This will replace good " bacteria that may have been lost due to the antibiotic. (This may also help to prevent yeast infections and upset stomach during the course of antibiotic.)   3. We will call if the tick labs are positive  4. Deep intentional breaths to help get fresh air to the lung bases    * Back here with: fevers, trouble breathing, worsening weakness despite treatment.     FYI: [email or web address]  is the webpage of the doc that speaks about chronic fatigue syndrome. Dr Matthieu Peres      Discharge References/Attachments     PNEUMONIA, TREATING (ENGLISH)         Review of your medicines      START taking        Dose / Directions Last dose taken    doxycycline 100 MG capsule   Commonly known as:  VIBRAMYCIN   Dose:  100 mg   Quantity:  14 capsule        Take 1 capsule (100 mg) by mouth 2 times daily for 7 days   Refills:  0          Our records show that you are taking the medicines listed below. If these are incorrect, please call your family doctor or clinic.        Dose / Directions Last dose taken    chlorthalidone 25 MG tablet   Commonly known as:  HYGROTON   Dose:  25 mg        Take 25 mg by mouth daily   Refills:  0        PROVIGIL PO   Dose:  200 mg        Take 200 mg by mouth States takes a bite off of it daily as a whole tablet is too much.   Refills:  0        sertraline 50 MG tablet   Commonly known as:  ZOLOFT   Dose:  50 mg        Take 50 mg by mouth daily   Refills:  0                Prescriptions were sent or printed at these locations (1 Prescription)                   [id number] - [address] [address]    Telephone:  [phone]   Fax:  [phone]   Hours:                  E-Prescribed (1 of 1)         doxycycline (VIBRAMYCIN) 100 MG capsule                Procedures and tests performed during your visit     Basic metabolic panel    CBC with platelets differential    CRP inflammation    Chest XR,  PA & LAT    Ehrlichia Anaplasma Sp  by PCR    Erythrocyte sedimentation rate auto    Lyme Disease Kimberly with reflex to WB Serum    TSH with free T4 reflex    UA reflex to Microscopic      Orders Needing Specimen Collection     None      Pending Results     Date and Time Order Name Status Description    9/1/2017 1704 Ehrlichia Anaplasma Sp by PCR In process     9/1/2017 1704 Lyme Disease Kimberly with reflex to WB Serum In process     9/1/2017 1700 Chest XR,  PA & LAT In process             Pending Culture Results     No orders found from 8/30/2017 to 9/2/2017.            Thank you for choosing [facility]       Thank you for choosing [facility] for your care. Our goal is always to provide you with excellent care. Hearing back from our patients is one way we can continue to improve our services. Please take a few minutes to complete the written survey that you may receive in the mail after you visit with us. Thank you!        [employer] Information     [employer] gives you secure access to your electronic health record. If you see a primary care provider, you can also send messages to your care team and make appointments. If you have questions, please call your primary care clinic.  If you do not have a primary care provider, please call [phone] and they will assist you.        Care EveryWhere ID     This is your Care EveryWhere ID. This could be used by other organizations to access your [facility] medical records  [id number]        Equal Access to Services     CHRISTINE GAFFNEY : Alaina pelaezo Sofaraz, waaxda luqadaha, qaybta kaalmada adejanayyada, charline palma. So [facility] [phone].    ATENCIÓN: Si habla español, tiene a riddle disposición servicios gratuitos de asistencia lingüística. Llame al [phone].    We comply with applicable federal civil rights laws and [place] laws. We do not discriminate on the basis of race, color, national origin, age, disability sex, sexual orientation or gender identity.            After Visit  Summary       This is your record. Keep this with you and show to your community pharmacist(s) and doctor(s) at your next visit.

## 2017-09-01 NOTE — ED AVS SNAPSHOT
[facility]    [address] [address]    Phone:  [phone]                                       Juanito Byrd   MRN: [id number]    Department:  [facility]   Date of Visit:  9/1/2017           After Visit Summary Signature Page     I have received my discharge instructions, and my questions have been answered. I have discussed any challenges I see with this plan with the nurse or doctor.    ..........................................................................................................................................  Patient/Patient Representative Signature      ..........................................................................................................................................  Patient Representative Print Name and Relationship to Patient    ..................................................               ................................................  Date                                            Time    ..........................................................................................................................................  Reviewed by Signature/Title    ...................................................              ..............................................  Date                                                            Time

## 2017-09-01 NOTE — ED NOTES
"In ED for \" weakness, fatigue,nauseous at times, and brain is foggy, symptoms for approximately 2 weeks.\"   "

## 2017-09-01 NOTE — ED PROVIDER NOTES
"  History     Chief Complaint   Patient presents with     Flu Symptoms     vomit and cough 2 weeks ago, not getting better. pt is weak and nauseated. pt has chronic fatigue syndrome.      The history is provided by the patient. No  was used.     Juanito Byrd is a 56 year old female with Hx chronic fatigue syndrome and anemia who presents with weakness and fatigue for 2 weeks. She reports symptoms started off with a cough and nausea with vomiting. Vomiting resolved. Cough is still productive, but less frequent. She remains nauseated tired and weak with chills. She denies any exotic travel. No known tick bites. No rashes. She does smoke up to 15 cigs/day. Known Hx of nodules and she is in the \"lung program\" at [facility] for annual visits.     I have reviewed the Medications, Allergies, Past Medical and Surgical History, and Social History in the Epic system.    Allergies as of 09/01/2017 - Krystian as Reviewed 09/01/2017   Allergen Reaction Noted     Duloxetine hydrochloride Palpitations 07/24/2013     Escitalopram oxalate Palpitations 07/24/2013     Venlafaxine hcl Palpitations 07/24/2013     Discharge Medication List as of 9/1/2017  7:28 PM      START taking these medications    Details   doxycycline (VIBRAMYCIN) 100 MG capsule Take 1 capsule (100 mg) by mouth 2 times daily for 7 days, Disp-14 capsule, R-0, E-Prescribe         CONTINUE these medications which have NOT CHANGED    Details   Modafinil (PROVIGIL PO) Take 200 mg by mouth States takes a bite off of it daily as a whole tablet is too much., Historical      sertraline (ZOLOFT) 50 MG tablet Take 50 mg by mouth daily, Historical      chlorthalidone (HYGROTON) 25 MG tablet Take 25 mg by mouth daily, Historical           Past Medical History:   Diagnosis Date     Anemia      Chronic fatigue syndrome 5/14/2008     Depressive disorder, not elsewhere classified 10/26/2006     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     " Hypertension      Sleep apnea     Unable to use CPAP-uses a dental appliance     Past Surgical History:   Procedure Laterality Date     ABDOMEN SURGERY      2 C Sections     caesarian section      x's 2     ENDOMETRIAL SAMPLING (BIOPSY)  04/24/2015     EYE SURGERY  1999    laser surgery     SINUS SURGERY  1990s     Family History   Problem Relation Age of Onset     Neurologic Disorder Mother      ALS     Coronary Artery Disease Mother      Hypertension Mother      Hyperlipidemia Mother      Thyroid Disease Mother      Ovarian Cancer Sister      s/p TAHBSO, clear     Gynecology Sister      MARCOS III     CANCER Brother      lung     DIABETES No family hx of      Asthma No family hx of      Social History     Social History     Marital status: Single     Spouse name: N/A     Number of children: N/A     Years of education: N/A     Occupational History     disability      Social History Main Topics     Smoking status: Current Every Day Smoker     Packs/day: 1.00     Types: Cigarettes     Smokeless tobacco: Never Used     Alcohol use 0.6 - 3.0 oz/week     1 - 5 Standard drinks or equivalent per week     Drug use: No     Sexual activity: No     Other Topics Concern      Service No     Blood Transfusions Yes     permits if needed     Caffeine Concern No     Occupational Exposure Not Asked     disability for chronic fatigue syndrome     Hobby Hazards No     Sleep Concern No     Stress Concern No     Weight Concern No     Special Diet No     Back Care No     Exercise No     Bike Helmet No     Seat Belt Yes     Self-Exams Yes     Parent/Sibling W/ Cabg, Mi Or Angioplasty Before 65f 55m? No     Social History Narrative    Currently single, mother of 2 grown children. Lives in [place]. Close to twin sister.     Debbi Hanna MD    1/9/15         Review of Systems   Constitutional: Positive for activity change, chills and fatigue. Negative for fever.   HENT: Positive for congestion. Negative for ear pain, sinus  pressure and sore throat.    Eyes: Negative.    Respiratory: Positive for cough and wheezing.    Cardiovascular: Negative.    Gastrointestinal: Positive for nausea. Negative for vomiting (resolved).   Skin: Negative.    Neurological: Positive for weakness.   Psychiatric/Behavioral: Negative.        Physical Exam   BP: 147/79  Pulse: 73  Temp: 98.4  F (36.9  C)  Resp: 16  SpO2: 99 %  Physical Exam   Constitutional: She is oriented to person, place, and time. She appears well-developed and well-nourished. No distress.   HENT:   Head: Normocephalic and atraumatic.   Right Ear: External ear normal.   Left Ear: External ear normal.   Mouth/Throat: Oropharynx is clear and moist.   Eyes: Conjunctivae and EOM are normal. Pupils are equal, round, and reactive to light.   Cardiovascular: Normal rate and normal heart sounds.    Pulmonary/Chest: Effort normal. She has no wheezes. She has no rales.   Neurological: She is alert and oriented to person, place, and time.   Skin: Skin is warm and dry.   Psychiatric: She has a normal mood and affect.   Nursing note and vitals reviewed.      ED Course     ED Course     Procedures    Labs Ordered and Resulted from Time of ED Arrival Up to the Time of Departure from the ED   URINE MACROSCOPIC WITH REFLEX TO MICRO - Abnormal; Notable for the following:        Result Value    Blood Urine Trace (*)     RBC Urine 3 (*)     Bacteria Urine None (*)     Amorphous Crystals Few (*)     All other components within normal limits   BASIC METABOLIC PANEL - Abnormal; Notable for the following:     Potassium 3.2 (*)     All other components within normal limits   CRP INFLAMMATION - Abnormal; Notable for the following:     CRP Inflammation 10.9 (*)     All other components within normal limits   CBC WITH PLATELETS DIFFERENTIAL   LYME DISEASE LESLIE WITH REFLEX TO WB SERUM   ERYTHROCYTE SEDIMENTATION RATE AUTO   EHRLICHIA ANAPLASMA SP BY PCR   TSH WITH FREE T4 REFLEX     Medications   0.9% sodium chloride  BOLUS (0 mLs Intravenous Stopped 9/1/17 1815)     CXR shows small infiltrate and air bronchograms on lateral view.     Assessments & Plan (with Medical Decision Making)     I have reviewed the nursing notes.  I have reviewed the findings, diagnosis, plan and need for follow up with the patient.      Discharge Medication List as of 9/1/2017  7:28 PM      START taking these medications    Details   doxycycline (VIBRAMYCIN) 100 MG capsule Take 1 capsule (100 mg) by mouth 2 times daily for 7 days, Disp-14 capsule, R-0, E-Prescribe             Final diagnoses:   Pneumonia of right lung due to infectious organism, unspecified part of lung   Will treat as pneumonia due to Hx/RF and XR. Labs are acceptable at this time with Lyme/anaplasmosis pending. She is advised to f/u with PCP in 7 days to re-evaluate.  Pt is agreeable to plan and she will seek attention prior to follow up with any worsening despite treatment.     9/1/2017   [facility]     Samuel Tatum PA  09/02/17 1340       Samuel Tatum PA  09/02/17 1345

## 2017-09-02 ENCOUNTER — HOSPITAL ENCOUNTER (EMERGENCY)
Facility: HOSPITAL | Age: 57
Discharge: HOME | End: 2017-09-02
Attending: PHYSICIAN ASSISTANT | Admitting: PHYSICIAN ASSISTANT
Payer: COMMERCIAL

## 2017-09-02 VITALS
RESPIRATION RATE: 16 BRPM | OXYGEN SATURATION: 98 % | SYSTOLIC BLOOD PRESSURE: 155 MMHG | HEART RATE: 67 BPM | TEMPERATURE: 98.6 F | DIASTOLIC BLOOD PRESSURE: 76 MMHG

## 2017-09-02 DIAGNOSIS — K21.00 GASTROESOPHAGEAL REFLUX DISEASE WITH ESOPHAGITIS: ICD-10-CM

## 2017-09-02 DIAGNOSIS — J20.9 ACUTE BRONCHITIS, UNSPECIFIED ORGANISM: ICD-10-CM

## 2017-09-02 PROCEDURE — 99213 OFFICE O/P EST LOW 20 MIN: CPT

## 2017-09-02 PROCEDURE — 99213 OFFICE O/P EST LOW 20 MIN: CPT | Performed by: PHYSICIAN ASSISTANT

## 2017-09-02 ASSESSMENT — ENCOUNTER SYMPTOMS
EYE REDNESS: 0
PSYCHIATRIC NEGATIVE: 1
VOMITING: 0
WEAKNESS: 1
WHEEZING: 1
CHILLS: 1
TROUBLE SWALLOWING: 0
APPETITE CHANGE: 0
VOMITING: 0
SINUS PRESSURE: 0
FATIGUE: 1
DIZZINESS: 0
FEVER: 0
COUGH: 1
EYE DISCHARGE: 0
NAUSEA: 0
NECK STIFFNESS: 0
ABDOMINAL PAIN: 0
FEVER: 0
NECK PAIN: 0
EYES NEGATIVE: 1
SINUS PRESSURE: 0
SORE THROAT: 0
NAUSEA: 1
MYALGIAS: 1
SORE THROAT: 0
FATIGUE: 1
VOICE CHANGE: 0
DIARRHEA: 0
CARDIOVASCULAR NEGATIVE: 1
CARDIOVASCULAR NEGATIVE: 1
ACTIVITY CHANGE: 1
COUGH: 1
LIGHT-HEADEDNESS: 0
PSYCHIATRIC NEGATIVE: 1
HEADACHES: 0

## 2017-09-02 NOTE — ED NOTES
C/o URI symptoms for the last few weeks, states that she has had body aches and fatigue as well. Reports history of aspriation from acid reflux and pnemonia.

## 2017-09-02 NOTE — ED AVS SNAPSHOT
[facility]    [address] [address]    Phone:  [phone]                                       Madeleine Allen   MRN: [id number]    Department:  [facility]   Date of Visit:  9/2/2017           After Visit Summary Signature Page     I have received my discharge instructions, and my questions have been answered. I have discussed any challenges I see with this plan with the nurse or doctor.    ..........................................................................................................................................  Patient/Patient Representative Signature      ..........................................................................................................................................  Patient Representative Print Name and Relationship to Patient    ..................................................               ................................................  Date                                            Time    ..........................................................................................................................................  Reviewed by Signature/Title    ...................................................              ..............................................  Date                                                            Time

## 2017-09-02 NOTE — DISCHARGE INSTRUCTIONS
"1. Doxycycline for 7 days to cover pneumonia/lower respiratory infection  2. Eat yogurt, kefir or take over-the-counter \"probiotic\" at least 2 hours before or after a dose of antibiotic. This will replace good bacteria that may have been lost due to the antibiotic. (This may also help to prevent yeast infections and upset stomach during the course of antibiotic.)   3. We will call if the tick labs are positive  4. Deep intentional breaths to help get fresh air to the lung bases    * Back here with: fevers, trouble breathing, worsening weakness despite treatment.     FYI: [email or web address]  is the webpage of the doc that speaks about chronic fatigue syndrome. Dr Matthieu Peres    "

## 2017-09-02 NOTE — ED PROVIDER NOTES
History     Chief Complaint   Patient presents with     URI     for a couple weeks     Cough     Generalized Body Aches     The history is provided by the patient. No  was used.     Madeleine Allen is a 56 year old female who has had cough/congestion, body aches, fatigue x 2 weeks. Pt has had worsening of her GERD. States she even has felt like she has had some almost aspiration.  No n/v/d/f/c.    I have reviewed the Medications, Allergies, Past Medical and Surgical History, and Social History in the Epic system.    Allergies: No Known Allergies      No current facility-administered medications on file prior to encounter.   Current Outpatient Prescriptions on File Prior to Encounter:  VITAMIN D, CHOLECALCIFEROL, PO Take 10,000 Units by mouth   Omeprazole (PRILOSEC PO) 20 mg   sertraline (ZOLOFT) 50 MG tablet Take 1.5 tablets (75 mg) by mouth daily Further refills after upcoming appointment on 4/29/16.   Ascorbic Acid (VITAMIN C PO) daily.   Multiple Vitamins-Minerals (ZINC PO) daily.       Patient Active Problem List   Diagnosis     YAZ III (vulvar intraepithelial neoplasia III)     Tobacco abuse     Advanced care planning/counseling discussion     History of ovarian cancer     Hypertrophy of nasal turbinates     DNS (deviated nasal septum)     Vitamin D deficiency     Gastroesophageal reflux disease without esophagitis     Cholecystitis     Hiatal hernia     Ulcer of esophagus without bleeding     Reed's esophagus without dysplasia     ACP (advance care planning)       Past Surgical History:   Procedure Laterality Date     ABDOMEN SURGERY  1962    BLOCKED INTESTINE FOOT AND HALF REMOVED OF INTESTINE     BIOPSY SKIN (LOCATION)  2/21/2014    Procedure: BIOPSY SKIN (LOCATION);;  Surgeon: Jackie Galindo MD;  [facility]     breast implants      bilateral     CHOLECYSTECTOMY  11/2016    laparoscopic choley     COLONOSCOPY  5/21/2015     ENT SURGERY       ESOPHAGOSCOPY, GASTROSCOPY,  "DUODENOSCOPY (EGD), COMBINED N/A 5/13/2016    Procedure: COMBINED ESOPHAGOSCOPY, GASTROSCOPY, DUODENOSCOPY (EGD);  Surgeon: Irvin Bolivar DO;  Location: [facility]     ESOPHAGOSCOPY, GASTROSCOPY, DUODENOSCOPY (EGD), COMBINED N/A 7/11/2016    Procedure: COMBINED ESOPHAGOSCOPY, GASTROSCOPY, DUODENOSCOPY (EGD), BIOPSY SINGLE OR MULTIPLE;  Surgeon: Wesly Webb MD;  [facility]     HC ESOPH/GAS REFLUX TEST W NASAL IMPED >1 HR N/A 7/12/2016    Procedure: ESOPHAGEAL IMPEDENCE FUNCTION TEST WITH 24 HOUR PH GREATER THAN 1 HOUR;  Surgeon: Wesly Webb MD;  [facility]     HYSTERECTOMY TOTAL ABD, DEANGELO SALPINGO-OOPHORECTOMY, NODE DISSECTION, TUMOR DEBULKING, COMBINED  2001    [facility] gyn onc - ovarian cancer     LASER CO2 VULVA  2005    [facility] gyn onc     LASER CO2 VULVA  1/29/2013    Procedure: LASER CO2 VULVA;  Right labial minora biopsy, perineal biopsy, guerrero-anal cautery;  Surgeon: Jackie Galindo MD;  [facility]     LASER CO2 VULVA  2/21/2014    Procedure: LASER CO2 VULVA;  Laser To Vulva,       LASER CO2 VULVA N/A 5/29/2015    Procedure: LASER CO2 VULVA;  Surgeon: Jackie Galindo MD;  [facility]     RECONSTRUCT BREAST BILATERAL, IMPLANT PROSTHESIS BILATERAL, COMBINED  2001 and 2007    enlargement     surgically removed  1962    intestinal blockage       Social History   Substance Use Topics     Smoking status: Current Every Day Smoker     Packs/day: 1.00     Years: 40.00     Types: Cigarettes     Start date: 1/1/2016     Smokeless tobacco: Never Used      Comment: planning in april     Alcohol use No      Comment: rare- social       Most Recent Immunizations   Administered Date(s) Administered     TDAP Vaccine (Boostrix) 01/31/2014       BMI: Estimated body mass index is 27.72 kg/(m^2) as calculated from the following:    Height as of 1/13/17: 1.549 m (5' 1\").    Weight as of 1/13/17: 66.5 kg (146 lb 11.2 oz).      Review of Systems   Constitutional: Positive for fatigue. " Negative for appetite change and fever.   HENT: Positive for congestion. Negative for ear pain, sinus pressure, sore throat, trouble swallowing and voice change.    Eyes: Negative for discharge and redness.   Respiratory: Positive for cough.    Cardiovascular: Negative.    Gastrointestinal: Negative for abdominal pain, diarrhea, nausea and vomiting.   Genitourinary: Negative.    Musculoskeletal: Positive for myalgias. Negative for neck pain and neck stiffness.   Skin: Negative for rash.   Neurological: Negative for dizziness, light-headedness and headaches.   Psychiatric/Behavioral: Negative.        Physical Exam   BP: 155/76  Pulse: 67  Temp: 98.6  F (37  C)  Resp: 16  SpO2: 98 %  Physical Exam   Constitutional: She is oriented to person, place, and time. She appears well-developed and well-nourished. No distress.   HENT:   Head: Normocephalic and atraumatic.   Right Ear: External ear normal.   Left Ear: External ear normal.   Mouth/Throat: Oropharynx is clear and moist.   Bilateral TMs/canals clear/wnl  No sinus TTP     Eyes: Conjunctivae and EOM are normal. Right eye exhibits no discharge. Left eye exhibits no discharge.   Neck: Normal range of motion. Neck supple.   Cardiovascular: Normal rate, regular rhythm and normal heart sounds.    Pulmonary/Chest: Effort normal and breath sounds normal. No respiratory distress.   Abdominal: Soft. Bowel sounds are normal. She exhibits no distension. There is no tenderness.   Neurological: She is alert and oriented to person, place, and time.   Skin: Skin is warm and dry. No rash noted. She is not diaphoretic.   Psychiatric: She has a normal mood and affect.   Nursing note and vitals reviewed.      ED Course     ED Course     Procedures        Assessments & Plan (with Medical Decision Making)     I have reviewed the nursing notes.    I have reviewed the findings, diagnosis, plan and need for follow up with the patient.    Discharge Medication List as of 9/2/2017  5:14 PM       START taking these medications    Details   amoxicillin-clavulanate (AUGMENTIN) 875-125 MG per tablet Take 1 tablet by mouth 2 times daily, Disp-20 tablet, R-0, E-Prescribe             Final diagnoses:   Acute bronchitis, unspecified organism   Gastroesophageal reflux disease with esophagitis         Due to length of s/s I chose to tx.  Patient verbally educated and given appropriate education sheets for each of the diagnoses and has no questions.  Take OTC  tylenol as directed on the bottle as needed.  Take prescription medications as directed.  Increase fluids, wash hands often.  Sleep in a recliner or with multiple pillows until this has resolved.  Follow up with your provider if symptoms increase or if concerns develop, return to the ER.  Dacia Sargent Certified   Physician Assistant  9/2/2017  7:06 PM  [facility]    9/2/2017   [facility]     Dacia Sargent PA  09/02/17 [id number]

## 2017-09-02 NOTE — ED NOTES
Patient discharged home at this time. Patient given written and verbal discharge instructions regarding home care, f/u and medications. Patient verbalized understanding of all discharge instructions. Aware RX available at [facility].

## 2017-09-06 LAB — B BURGDOR IGG+IGM SER QL: 0.02 (ref 0–0.89)

## 2017-09-07 LAB
A PHAGOCYTOPH DNA BLD QL NAA+PROBE: NOT DETECTED
E CHAFFEENSIS DNA BLD QL NAA+PROBE: NOT DETECTED
E EWINGII DNA SPEC QL NAA+PROBE: NOT DETECTED
EHRLICHIA DNA SPEC QL NAA+PROBE: NOT DETECTED

## 2017-09-07 NOTE — PROGRESS NOTES
TC to patient, notified of negative Anaplasma phagocytophilum, Ehrlichia chaffeensis, Ehrlichia ewingii/canis, and Ehrlichia muris-like results.  No further questions or concerns at this time.

## 2017-09-13 ENCOUNTER — OFFICE VISIT (OUTPATIENT)
Dept: FAMILY MEDICINE | Facility: OTHER | Age: 57
End: 2017-09-13
Attending: NURSE PRACTITIONER
Payer: COMMERCIAL

## 2017-09-13 VITALS
HEART RATE: 68 BPM | DIASTOLIC BLOOD PRESSURE: 80 MMHG | TEMPERATURE: 100.5 F | SYSTOLIC BLOOD PRESSURE: 128 MMHG | HEIGHT: 61 IN | WEIGHT: 139 LBS | BODY MASS INDEX: 26.24 KG/M2

## 2017-09-13 DIAGNOSIS — G89.29 CHRONIC MIDLINE LOW BACK PAIN WITH SCIATICA, SCIATICA LATERALITY UNSPECIFIED: ICD-10-CM

## 2017-09-13 DIAGNOSIS — Z71.89 ACP (ADVANCE CARE PLANNING): Chronic | ICD-10-CM

## 2017-09-13 DIAGNOSIS — M54.40 CHRONIC MIDLINE LOW BACK PAIN WITH SCIATICA, SCIATICA LATERALITY UNSPECIFIED: ICD-10-CM

## 2017-09-13 DIAGNOSIS — M25.511 RIGHT SHOULDER PAIN, UNSPECIFIED CHRONICITY: ICD-10-CM

## 2017-09-13 DIAGNOSIS — Z71.6 TOBACCO ABUSE COUNSELING: ICD-10-CM

## 2017-09-13 DIAGNOSIS — F33.1 MODERATE EPISODE OF RECURRENT MAJOR DEPRESSIVE DISORDER (H): Primary | ICD-10-CM

## 2017-09-13 DIAGNOSIS — R20.0 NUMBNESS IN BOTH HANDS: ICD-10-CM

## 2017-09-13 DIAGNOSIS — Z72.0 TOBACCO ABUSE: ICD-10-CM

## 2017-09-13 PROCEDURE — 99214 OFFICE O/P EST MOD 30 MIN: CPT | Performed by: NURSE PRACTITIONER

## 2017-09-13 RX ORDER — PYRIDOXINE HCL (VITAMIN B6) 50 MG
TABLET ORAL
COMMUNITY

## 2017-09-13 ASSESSMENT — ANXIETY QUESTIONNAIRES
5. BEING SO RESTLESS THAT IT IS HARD TO SIT STILL: NOT AT ALL
2. NOT BEING ABLE TO STOP OR CONTROL WORRYING: SEVERAL DAYS
4. TROUBLE RELAXING: SEVERAL DAYS
6. BECOMING EASILY ANNOYED OR IRRITABLE: SEVERAL DAYS
7. FEELING AFRAID AS IF SOMETHING AWFUL MIGHT HAPPEN: NEARLY EVERY DAY
IF YOU CHECKED OFF ANY PROBLEMS ON THIS QUESTIONNAIRE, HOW DIFFICULT HAVE THESE PROBLEMS MADE IT FOR YOU TO DO YOUR WORK, TAKE CARE OF THINGS AT HOME, OR GET ALONG WITH OTHER PEOPLE: VERY DIFFICULT
3. WORRYING TOO MUCH ABOUT DIFFERENT THINGS: SEVERAL DAYS
GAD7 TOTAL SCORE: 10
1. FEELING NERVOUS, ANXIOUS, OR ON EDGE: NEARLY EVERY DAY

## 2017-09-13 ASSESSMENT — PATIENT HEALTH QUESTIONNAIRE - PHQ9: SUM OF ALL RESPONSES TO PHQ QUESTIONS 1-9: 17

## 2017-09-13 NOTE — PROGRESS NOTES
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  Pain, aching, pain with ROM, pulling, reaching, pushing.  Radiates to right pectoris at times  No injury, possible over-use      Depression Follow up      Status since last visit: continues, due to stress    See PHQ-9 for current symptoms.  Other associated symptoms: None    Complicating factors:    Significant life event:  No    Current substance abuse:  None   Anxiety or Panic symptoms:  No    PHQ-9  English  PHQ-9   Any Language        I have not seen Marija for a year and a half.  She has been at [place], and the  [facility], and also has done some doctoring in [place].  She has primarily communicated via my chart.       Problem list and histories reviewed & adjusted, as indicated.  Additional history: as documented    Patient Active Problem List   Diagnosis     YAZ III (vulvar intraepithelial neoplasia III)     Tobacco abuse     History of ovarian cancer     Hypertrophy of nasal turbinates     DNS (deviated nasal septum)     Vitamin D deficiency     Gastroesophageal reflux disease without esophagitis     Hiatal hernia     Reed's esophagus without dysplasia     ACP (advance care planning)     Past Surgical History:   Procedure Laterality Date     ABDOMEN SURGERY  1962    BLOCKED INTESTINE FOOT AND HALF REMOVED OF INTESTINE     BIOPSY SKIN (LOCATION)  2/21/2014    Procedure: BIOPSY SKIN (LOCATION);;  Surgeon: Mateo  Jackie Molina MD;  [facility]     breast implants      bilateral     CHOLECYSTECTOMY  11/2016    laparoscopic choley     COLONOSCOPY  5/21/2015     ENT SURGERY       ESOPHAGOSCOPY, GASTROSCOPY, DUODENOSCOPY (EGD), COMBINED N/A 5/13/2016    Procedure: COMBINED ESOPHAGOSCOPY, GASTROSCOPY, DUODENOSCOPY (EGD);  Surgeon: Irvin Bolivar DO;  [facility]     ESOPHAGOSCOPY, GASTROSCOPY, DUODENOSCOPY (EGD), COMBINED N/A 7/11/2016    Procedure: COMBINED ESOPHAGOSCOPY, GASTROSCOPY, DUODENOSCOPY (EGD), BIOPSY SINGLE OR MULTIPLE;  Surgeon: Wesly Webb MD;  Location:  GI     GYN SURGERY  06/13/2017    laser vulva     HC ESOPH/GAS REFLUX TEST W NASAL IMPED >1 HR N/A 7/12/2016    Procedure: ESOPHAGEAL IMPEDENCE FUNCTION TEST WITH 24 HOUR PH GREATER THAN 1 HOUR;  Surgeon: Wesly Webb MD;  Location:  GI     HYSTERECTOMY TOTAL ABD, DEANGELO SALPINGO-OOPHORECTOMY, NODE DISSECTION, TUMOR DEBULKING, COMBINED  2001    [facility] gyn onc - ovarian cancer     LASER CO2 VULVA  2005    [facility] gyn onc     LASER CO2 VULVA  1/29/2013    Procedure: LASER CO2 VULVA;  Right labial minora biopsy, perineal biopsy, guerrero-anal cautery;  Surgeon: Jackie Galindo MD;  [facility]     LASER CO2 VULVA  2/21/2014    Procedure: LASER CO2 VULVA;  Laser To Vulva,       LASER CO2 VULVA N/A 5/29/2015    Procedure: LASER CO2 VULVA;  Surgeon: Jackie Galindo MD;  [facility]     RECONSTRUCT BREAST BILATERAL, IMPLANT PROSTHESIS BILATERAL, COMBINED  2001 and 2007    enlargement     surgically removed  1962    intestinal blockage       Social History   Substance Use Topics     Smoking status: Current Every Day Smoker     Packs/day: 1.00     Years: 40.00     Types: Cigarettes     Start date: 1/1/2016     Smokeless tobacco: Never Used      Comment: planning in april     Alcohol use No      Comment: rare- social     Family History   Problem Relation Age of Onset     Connective Tissue Disorder Mother      Radha Gehrig Disease      Other - See Comments Mother 76     ALS     Hypertension Mother      Hyperlipidemia Mother      Genetic Disorder Mother      ALS     CANCER Brother 62     lung     Cardiovascular Brother 53     aortic valve disease     Alcohol/Drug Father      Alcohol abuse from post traumatic stress dysorder-war vet     Other - See Comments Sister      chronic fatigue     Cardiovascular Sister      Hypertension Sister      Other Cancer Brother      LUNG CANCER      Depression Sister      CHRONIC FATIGUE SYNDROME WHICH BRINGS ON DEPRESSIO     MENTAL ILLNESS Nephew      SCHIZOPHRENIA     Asthma No family hx of      Thyroid Disease No family hx of      C.A.D. No family hx of          Current Outpatient Prescriptions   Medication Sig Dispense Refill     Cyanocobalamin (B-12) 100 MCG TABS        aspirin 81 MG tablet Take by mouth daily       sertraline (ZOLOFT) 50 MG tablet Take 1.5 tablets (75 mg) by mouth daily Further refills after upcoming appointment on 16. 135 tablet 1     UNABLE TO FIND Lidoderm patch q 12 hours as needed 30 each 3     amoxicillin-clavulanate (AUGMENTIN) 875-125 MG per tablet Take 1 tablet by mouth 2 times daily 20 tablet 0     VITAMIN D, CHOLECALCIFEROL, PO Take 1,000 Units by mouth daily        Omeprazole (PRILOSEC PO) 20 mg       Ascorbic Acid (VITAMIN C PO) daily.       Multiple Vitamins-Minerals (ZINC PO) daily.       [DISCONTINUED] sertraline (ZOLOFT) 50 MG tablet Take 1.5 tablets (75 mg) by mouth daily Further refills after upcoming appointment on 16. 135 tablet 3     Allergies   Allergen Reactions     Bupropion Palpitations     Recent Labs   Lab Test  17   0800 17   1135   07/08/10   1143   HDL   --    --    --    --   43*   ALT  27  35  33   < >   --    CR  0.57  0.56  0.57*  0.50*   < >   --    GFRESTIMATED  >90  Non  GFR Calc    >90  Non  GFR Calc     --   >90  Non  GFR Calc     < >   --    GFRESTBLACK   ">90   GFR Calc    >90   GFR Calc     --   >90   GFR Calc     < >   --    POTASSIUM  3.9  3.9  3.8  4.1   < >   --    TSH  1.65  1.190   --    < >   --     < > = values in this interval not displayed.      BP Readings from Last 3 Encounters:   09/13/17 128/80   09/02/17 155/76   04/19/17 138/79    Wt Readings from Last 3 Encounters:   09/13/17 139 lb (63 kg)   01/13/17 146 lb 11.2 oz (66.5 kg)   10/25/16 147 lb (66.7 kg)                  Labs reviewed in EPIC          Reviewed and updated as needed this visit by clinical staff     Reviewed and updated as needed this visit by Provider         ROS:  Constitutional, HEENT, cardiovascular, pulmonary, gi and gu systems are negative, except as otherwise noted.      OBJECTIVE:   /80 (BP Location: Left arm, Patient Position: Chair, Cuff Size: Adult Large)  Pulse 68  Temp 100.5  F (38.1  C)  Ht 5' 1\" (1.549 m)  Wt 139 lb (63 kg)  BMI 26.26 kg/m2  Body mass index is 26.26 kg/(m^2).     GENERAL: healthy, alert and no distress  HENT: ear canals and TM's normal, nose and mouth without ulcers or lesions  NECK: no adenopathy, no asymmetry, masses, or scars and thyroid normal to palpation  RESP: lungs clear to auscultation - no rales, rhonchi or wheezes  CV: regular rate and rhythm, normal S1 S2, no S3 or S4, no murmur, click or rub, no peripheral edema and peripheral pulses strong  MS: trigger point tenderness - back, upper  SKIN: no suspicious lesions or rashes  PSYCH: anxious and tearful, stressed       We need to request her [place] labs and notes    Visit time:   30 Minutes  Time spent with patient, including examination, face to face patient education - 15 mn  Visit Content: During our face to face time, patient education was provided regarding diagnosis, and treatment pan. Patient counseled regarding disease process  All diagnosis and treatment plan are reviewed with the patient, 50 % of face to face time is directed " at patient education  Record review completed      ASSESSMENT/PLAN:       1. Moderate episode of recurrent major depressive disorder (H)  - sertraline (ZOLOFT) 50 MG tablet; Take 1.5 tablets (75 mg) by mouth daily Further refills after upcoming appointment on 4/29/16.  Dispense: 135 tablet; Refill: 1    2. Chronic midline low back pain with sciatica, sciatica laterality unspecified  - UNABLE TO FIND; Lidoderm patch q 12 hours as needed  Dispense: 30 each; Refill: 3    3. ACP (advance care planning)  - Addressed    4. Tobacco abuse  - Tobacco Cessation - for Health Maintenance    5. Tobacco abuse counseling  - Tobacco Cessation - for Health Maintenance    6.  Right shoulder pain, hand numbness  - Orthopedic consult ordered      Nicole Carbajal NP  [facility]

## 2017-09-13 NOTE — NURSING NOTE
"No chief complaint on file.      Initial /80 (BP Location: Left arm, Patient Position: Chair, Cuff Size: Adult Large)  Pulse 68  Temp 100.5  F (38.1  C)  Ht 5' 1\" (1.549 m)  Wt 139 lb (63 kg)  BMI 26.26 kg/m2 Estimated body mass index is 26.26 kg/(m^2) as calculated from the following:    Height as of this encounter: 5' 1\" (1.549 m).    Weight as of this encounter: 139 lb (63 kg).  Medication Reconciliation: complete     Naz Blandon      "

## 2017-09-13 NOTE — MR AVS SNAPSHOT
After Visit Summary   9/13/2017    Madeleine Allen    MRN: [id number]           Patient Information     Date Of Birth          1960        Visit Information        Provider Department      9/13/2017 9:45 AM Nicole Carbajal, NP [facility]        Today's Diagnoses     Moderate episode of recurrent major depressive disorder (H)    -  1    Chronic midline low back pain with sciatica, sciatica laterality unspecified        ACP (advance care planning)        Tobacco abuse        Tobacco abuse counseling          Care Instructions        1. Moderate episode of recurrent major depressive disorder (H)  - sertraline (ZOLOFT) 50 MG tablet; Take 1.5 tablets (75 mg) by mouth daily Further refills after upcoming appointment on 4/29/16.  Dispense: 135 tablet; Refill: 1    2. Chronic midline low back pain with sciatica, sciatica laterality unspecified  - UNABLE TO FIND; Lidoderm patch q 12 hours as needed  Dispense: 30 each; Refill: 3    3. ACP (advance care planning)  - Addressed    4. Tobacco abuse  - Tobacco Cessation - for Health Maintenance    5. Tobacco abuse counseling  - Tobacco Cessation - for Health Maintenance      Nicole Carbajal NP  [facility]      HOW TO QUIT SMOKING  Smoking is one of the hardest habits to break. About half of all those who have ever smoked have been able to quit, and most of those (about 70%) who still smoke want to quit. Here are some of the best ways to stop smoking.     KEEP TRYING:  It takes most smokers about 8 tries before they are finally able to fully quit. So, the more often you try and fail, the better your chance of quitting the next time! So, don't give up!    GO COLD TURKEY:  Most ex-smokers quit cold turkey. Trying to cut back gradually doesn't seem to work as well, perhaps because it continues the smoking habit. Also, it is possible to fool yourself by inhaling more while smoking fewer cigarettes. This results in the same amount of nicotine in your  body!    GET SUPPORT:  Support programs can make an important difference, especially for the heavy smoker. These groups offer lectures, methods to change your behavior and peer support. Call the free national Quitline for more information. 800-QUIT-NOW (173-667-8677). Low-cost or free programs are offered by many hospitals, local chapters of the American Lung Association (068-231-2217) and the American Cancer Society (255-647-0790). Support at home is important too. Non-smokers can help by offering praise and encouragement. If the smoker fails to quit, encourage them to try again!    OVER-THE-COUNTER MEDICINES:  For those who can't quit on their own, Nicotine Replacement Therapy (NRT) may make quitting much easier. Certain aids such as the nicotine patch, gum and lozenge are available without a prescription. However, it is best to use these under the guidance of your doctor. The skin patch provides a steady supply of nicotine to the body. Nicotine gum and lozenge gives temporary bursts of low levels of nicotine. Both methods take the edge off the craving for cigarettes. WARNING: If you feel symptoms of nicotine overdose, such as nausea, vomiting, dizziness, weakness, or fast heartbeat, stop using these and see your doctor.    PRESCRIPTION MEDICINES:  After evaluating your smoking patterns and prior attempts at quitting, your doctor may offer a prescription medicine such as bupropion (Zyban, Wellbutrin), varenicline (Chantix, Champix), a niocotine inhaler or nasal spray. Each has its unique advantage and side effects which your doctor can review with you.    HEALTH BENEFITS OF QUITTING:  The benefits of quitting start right away and keep improving the longer you go without smokin minutes: blood pressure and pulse return to normal  8 hours: oxygen levels return to normal  2 days: ability to smell and taste begins to improve as damaged nerves start to regrow  2-3 weeks: circulation and lung function improves  1-9  months: decreased cough, congestion and shortness of breath; less tired  1 year: risk of heart attack decreases by half  5 years: risk of lung cancer decreases by half; risk of stroke becomes the same as a non-smoker  For information about how to quit smoking, visit the following links:  National Cancer [place] ,   Clearing the Air, Quit Smoking Today   - an online booklet. http://www.smokefree.gov/pubs/clearing_the_air.pdf  Smokefree.gov http://smokefree.gov/  QuitNet http://www.quitnet.com/    6992-9847 Patricia Siegel, [address] [place] [address], [place] [address]. All rights reserved. This information is not intended as a substitute for professional medical care. Always follow your healthcare professional's instructions.    The Benefits of Living Smoke Free  What do you want to gain from quitting? Check off some reasons to quit.  Health Benefits  ___ Reduce my risk of lung cancer, heart disease, chronic lung disease  ___ Have fewer wrinkles and softer skin  ___ Improve my sense of taste and smell  ___ For pregnant women--reduce the risk of having a miscarriage, stillbirth, premature birth, or low-birth-weight baby  Personal Benefits  ___ Feel more in control of my life  ___ Have better-smelling hair, breath, clothes, home, and car  ___ Save time by not having to take smoke breaks, buy cigarettes, or hunt for a light  ___ Have whiter teeth  Family Benefits  ___ Reduce my children s respiratory tract infections  ___ Set a good example for my children  ___ Reduce my family s cancer risk  Financial Benefits  ___ Save hundreds of dollars each year that would be spent on cigarettes  ___ Save money on medical bills  ___ Save on life, health, and car insurance premiums    Those Dollars Add Up!  Cigarettes are expensive, and getting more expensive all the time. Do you realize how much money you are spending on cigarettes per year? What is the average amount you spend on a pack of cigarettes? What is the average number of  packs that you smoke per day? Using your answers to these questions, fill in this formula to help you find out:  ($ _____ per pack) ×  ( _____ number of packs per day) × (365 days) =  $ _____ yearly cost of smoking  Besides tobacco, there are other costs, including extra cleaning bills and replacement costs for clothing and furniture; medical expenses for smoking-related illnesses; and higher health, life, and car insurance premiums.    Cigars and Pipes Count Too!  Cigars and pipes are also dangerous. So are smokeless (chewing) tobacco and snuff. All of these products contain nicotine, a highly addictive substance that has harmful effects on your body. Quitting smoking means giving up all tobacco products.      3147-3821 [facility], [address] [place] [address], [place] [address]. All rights reserved. This information is not intended as a substitute for professional medical care. Always follow your healthcare professional's instructions.          Follow-ups after your visit        Who to contact     If you have questions or need follow up information about today's clinic visit or your schedule please contact [facility] directly at [phone].  Normal or non-critical lab and imaging results will be communicated to you by MyChart, letter or phone within 4 business days after the clinic has received the results. If you do not hear from us within 7 days, please contact the clinic through [employer] or phone. If you have a critical or abnormal lab result, we will notify you by phone as soon as possible.  Submit refill requests through [employer] or call your pharmacy and they will forward the refill request to us. Please allow 3 business days for your refill to be completed.          Additional Information About Your Visit        [employer] Information     [employer] gives you secure access to your electronic health record. If you see a primary care provider, you can also send messages to your care team and make  "appointments. If you have questions, please call your primary care clinic.  If you do not have a primary care provider, please call [phone] and they will assist you.        Care EveryWhere ID     This is your Care EveryWhere ID. This could be used by other organizations to access your [facility] medical records  [id number]        Your Vitals Were     Pulse Temperature Height BMI (Body Mass Index)          68 100.5  F (38.1  C) 5' 1\" (1.549 m) 26.26 kg/m2         Blood Pressure from Last 3 Encounters:   09/13/17 128/80   09/02/17 155/76   04/19/17 138/79    Weight from Last 3 Encounters:   09/13/17 139 lb (63 kg)   01/13/17 146 lb 11.2 oz (66.5 kg)   10/25/16 147 lb (66.7 kg)              We Performed the Following     Tobacco Cessation - for Health Maintenance     TOBACCO CESSATION - FOR HEALTH MAINTENANCE          Today's Medication Changes          These changes are accurate as of: 9/13/17 10:24 AM.  If you have any questions, ask your nurse or doctor.               These medicines have changed or have updated prescriptions.        Dose/Directions    UNABLE TO FIND   This may have changed:  additional instructions   Used for:  Chronic midline low back pain with sciatica, sciatica laterality unspecified   Changed by:  Nicole Carbajal NP        Lidoderm patch q 12 hours as needed   Quantity:  30 each   Refills:  3         Stop taking these medicines if you haven't already. Please contact your care team if you have questions.     lidocaine   Commonly known as:  LIDODERM   Stopped by:  Nicole Carbajal NP                Where to get your medicines      These medications were sent to [facility] [address] [place], [place] - [address]   [address] , [facility] [address]     Phone:  [phone]     sertraline 50 MG tablet         Some of these will need a paper prescription and others can be bought over the counter.  Ask your nurse if you have questions.     Bring a paper prescription for each of " these medications     UNABLE TO FIND                Primary Care Provider Office Phone # Fax #    Nicole Carbajal [phone]       [address] [address]        Equal Access to Services     ASPEN MASTERS : Hadii aad ku hadoksanao Solemuelali, waaxda luqadaha, qaybta kaalmada adeegyada, zhang lawsonjean-paul barahonajordon hayes charley corona. So [facility] [phone].    ATENCIÓN: Si habla español, tiene a casarez disposición servicios gratuitos de asistencia lingüística. [facility] [phone].    We comply with applicable federal civil rights laws and [place] laws. We do not discriminate on the basis of race, color, national origin, age, disability sex, sexual orientation or gender identity.            Thank you!     Thank you for choosing [facility]  for your care. Our goal is always to provide you with excellent care. Hearing back from our patients is one way we can continue to improve our services. Please take a few minutes to complete the written survey that you may receive in the mail after your visit with us. Thank you!             Your Updated Medication List - Protect others around you: Learn how to safely use, store and throw away your medicines at www.disposemymeds.org.          This list is accurate as of: 9/13/17 10:24 AM.  Always use your most recent med list.                   Brand Name Dispense Instructions for use Diagnosis    amoxicillin-clavulanate 875-125 MG per tablet    AUGMENTIN    20 tablet    Take 1 tablet by mouth 2 times daily        aspirin 81 MG tablet      Take by mouth daily        B-12 100 MCG Tabs           PRILOSEC PO      20 mg        sertraline 50 MG tablet    ZOLOFT    135 tablet    Take 1.5 tablets (75 mg) by mouth daily Further refills after upcoming appointment on 4/29/16.    Moderate episode of recurrent major depressive disorder (H)       UNABLE TO FIND     30 each    Lidoderm patch q 12 hours as needed    Chronic midline low back pain with  sciatica, sciatica laterality unspecified       VITAMIN C PO      daily.        VITAMIN D (CHOLECALCIFEROL) PO      Take 1,000 Units by mouth daily        ZINC PO      daily.

## 2017-09-13 NOTE — PATIENT INSTRUCTIONS
1. Moderate episode of recurrent major depressive disorder (H)  - sertraline (ZOLOFT) 50 MG tablet; Take 1.5 tablets (75 mg) by mouth daily Further refills after upcoming appointment on 4/29/16.  Dispense: 135 tablet; Refill: 1    2. Chronic midline low back pain with sciatica, sciatica laterality unspecified  - UNABLE TO FIND; Lidoderm patch q 12 hours as needed  Dispense: 30 each; Refill: 3    3. ACP (advance care planning)  - Addressed    4. Tobacco abuse  - Tobacco Cessation - for Health Maintenance    5. Tobacco abuse counseling  - Tobacco Cessation - for Health Maintenance      Nicole Carbajal NP  [facility]      HOW TO QUIT SMOKING  Smoking is one of the hardest habits to break. About half of all those who have ever smoked have been able to quit, and most of those (about 70%) who still smoke want to quit. Here are some of the best ways to stop smoking.     KEEP TRYING:  It takes most smokers about 8 tries before they are finally able to fully quit. So, the more often you try and fail, the better your chance of quitting the next time! So, don't give up!    GO COLD TURKEY:  Most ex-smokers quit cold turkey. Trying to cut back gradually doesn't seem to work as well, perhaps because it continues the smoking habit. Also, it is possible to fool yourself by inhaling more while smoking fewer cigarettes. This results in the same amount of nicotine in your body!    GET SUPPORT:  Support programs can make an important difference, especially for the heavy smoker. These groups offer lectures, methods to change your behavior and peer support. Call the free national Quitline for more information. 800-QUIT-NOW (451-710-7480). Low-cost or free programs are offered by many hospitals, local chapters of the American Lung Association (866-014-9096) and the American Cancer Society (559-370-2510). Support at home is important too. Non-smokers can help by offering praise and encouragement. If the smoker fails to quit,  encourage them to try again!    OVER-THE-COUNTER MEDICINES:  For those who can't quit on their own, Nicotine Replacement Therapy (NRT) may make quitting much easier. Certain aids such as the nicotine patch, gum and lozenge are available without a prescription. However, it is best to use these under the guidance of your doctor. The skin patch provides a steady supply of nicotine to the body. Nicotine gum and lozenge gives temporary bursts of low levels of nicotine. Both methods take the edge off the craving for cigarettes. WARNING: If you feel symptoms of nicotine overdose, such as nausea, vomiting, dizziness, weakness, or fast heartbeat, stop using these and see your doctor.    PRESCRIPTION MEDICINES:  After evaluating your smoking patterns and prior attempts at quitting, your doctor may offer a prescription medicine such as bupropion (Zyban, Wellbutrin), varenicline (Chantix, Champix), a niocotine inhaler or nasal spray. Each has its unique advantage and side effects which your doctor can review with you.    HEALTH BENEFITS OF QUITTING:  The benefits of quitting start right away and keep improving the longer you go without smokin minutes: blood pressure and pulse return to normal  8 hours: oxygen levels return to normal  2 days: ability to smell and taste begins to improve as damaged nerves start to regrow  2-3 weeks: circulation and lung function improves  1-9 months: decreased cough, congestion and shortness of breath; less tired  1 year: risk of heart attack decreases by half  5 years: risk of lung cancer decreases by half; risk of stroke becomes the same as a non-smoker  For information about how to quit smoking, visit the following links:  National Cancer [place] ,   Clearing the Air, Quit Smoking Today   - an online booklet. http://www.smokefree.gov/pubs/clearing_the_air.pdf  Smokefree.gov http://smokefree.gov/  QuitNet http://www.quitnet.com/    0650-8414 Patricia Siegel, [address] [facility],  [place] [address]. All rights reserved. This information is not intended as a substitute for professional medical care. Always follow your healthcare professional's instructions.    The Benefits of Living Smoke Free  What do you want to gain from quitting? Check off some reasons to quit.  Health Benefits  ___ Reduce my risk of lung cancer, heart disease, chronic lung disease  ___ Have fewer wrinkles and softer skin  ___ Improve my sense of taste and smell  ___ For pregnant women reduce the risk of having a miscarriage, stillbirth, premature birth, or low-birth-weight baby  Personal Benefits  ___ Feel more in control of my life  ___ Have better-smelling hair, breath, clothes, home, and car  ___ Save time by not having to take smoke breaks, buy cigarettes, or hunt for a light  ___ Have whiter teeth  Family Benefits  ___ Reduce my children s respiratory tract infections  ___ Set a good example for my children  ___ Reduce my family s cancer risk  Financial Benefits  ___ Save hundreds of dollars each year that would be spent on cigarettes  ___ Save money on medical bills  ___ Save on life, health, and car insurance premiums    Those Dollars Add Up!  Cigarettes are expensive, and getting more expensive all the time. Do you realize how much money you are spending on cigarettes per year? What is the average amount you spend on a pack of cigarettes? What is the average number of packs that you smoke per day? Using your answers to these questions, fill in this formula to help you find out:  ($ _____ per pack) ×  ( _____ number of packs per day) × (365 days) =  $ _____ yearly cost of smoking  Besides tobacco, there are other costs, including extra cleaning bills and replacement costs for clothing and furniture; medical expenses for smoking-related illnesses; and higher health, life, and car insurance premiums.    Cigars and Pipes Count Too!  Cigars and pipes are also dangerous. So are smokeless (chewing) tobacco and snuff. All  of these products contain nicotine, a highly addictive substance that has harmful effects on your body. Quitting smoking means giving up all tobacco products.      1653-0492 [facility], [address] [place] [address], [place] [address]. All rights reserved. This information is not intended as a substitute for professional medical care. Always follow your healthcare professional's instructions.

## 2017-09-14 ASSESSMENT — ANXIETY QUESTIONNAIRES: GAD7 TOTAL SCORE: 10

## 2017-09-15 DIAGNOSIS — M25.511 RIGHT SHOULDER PAIN: Primary | ICD-10-CM

## 2017-09-20 ENCOUNTER — TELEPHONE (OUTPATIENT)
Dept: FAMILY MEDICINE | Facility: OTHER | Age: 57
End: 2017-09-20
Payer: COMMERCIAL

## 2017-09-20 ENCOUNTER — OFFICE VISIT (OUTPATIENT)
Dept: ORTHOPEDICS | Facility: OTHER | Age: 57
End: 2017-09-20
Attending: NURSE PRACTITIONER
Payer: COMMERCIAL

## 2017-09-20 VITALS
SYSTOLIC BLOOD PRESSURE: 142 MMHG | TEMPERATURE: 99.2 F | OXYGEN SATURATION: 96 % | DIASTOLIC BLOOD PRESSURE: 87 MMHG | HEIGHT: 61 IN | RESPIRATION RATE: 18 BRPM | HEART RATE: 76 BPM | BODY MASS INDEX: 26.43 KG/M2 | WEIGHT: 140 LBS

## 2017-09-20 DIAGNOSIS — M75.111 INCOMPLETE ROTATOR CUFF TEAR OR RUPTURE OF RIGHT SHOULDER, NOT SPECIFIED AS TRAUMATIC: ICD-10-CM

## 2017-09-20 DIAGNOSIS — M79.642 BILATERAL HAND PAIN: Primary | ICD-10-CM

## 2017-09-20 DIAGNOSIS — M79.641 BILATERAL HAND PAIN: Primary | ICD-10-CM

## 2017-09-20 LAB — CK SERPL-CCNC: 80 U/L (ref 30–225)

## 2017-09-20 PROCEDURE — 36415 COLL VENOUS BLD VENIPUNCTURE: CPT | Performed by: NURSE PRACTITIONER

## 2017-09-20 PROCEDURE — 73030 X-RAY EXAM OF SHOULDER: CPT | Mod: TC | Performed by: RADIOLOGY

## 2017-09-20 PROCEDURE — 73130 X-RAY EXAM OF HAND: CPT | Mod: TC | Performed by: RADIOLOGY

## 2017-09-20 PROCEDURE — 82550 ASSAY OF CK (CPK): CPT | Performed by: NURSE PRACTITIONER

## 2017-09-20 PROCEDURE — 99203 OFFICE O/P NEW LOW 30 MIN: CPT | Performed by: ORTHOPAEDIC SURGERY

## 2017-09-20 ASSESSMENT — PAIN SCALES - GENERAL: PAINLEVEL: NO PAIN (0)

## 2017-09-20 NOTE — NURSING NOTE
"Chief Complaint   Patient presents with     Musculoskeletal Problem     Bilateral shoulder pain and hand pain both       Initial /87 (BP Location: Left arm, Patient Position: Sitting, Cuff Size: Adult Large)  Pulse 76  Temp 99.2  F (37.3  C) (Tympanic)  Resp 18  Ht 5' 1\" (1.549 m)  Wt 140 lb (63.5 kg)  SpO2 96%  BMI 26.45 kg/m2 Estimated body mass index is 26.45 kg/(m^2) as calculated from the following:    Height as of this encounter: 5' 1\" (1.549 m).    Weight as of this encounter: 140 lb (63.5 kg).  Medication Reconciliation: complete   Shanti Alfaro LPN      "

## 2017-09-20 NOTE — TELEPHONE ENCOUNTER
Pt here requesting CK level, was elevated 3 months ago at [facility], want to recheck, ok alisia Rivera

## 2017-09-20 NOTE — PROGRESS NOTES
Patient is a 56-year-old female with chief complaint of pain in her right shoulder graded as moderate to severe, and occasional intermittent pain in her left shoulder.  She also complains of numbness and tingling in the thumb index and middle finger of both hands.  Shoulder pain in the right shoulder is chronic, she first noticed this several years ago that waxes and wanes but over the last several months is been chronic, it interferes with her sleep, and is aggravated by lifting pushing pulling.  She is a  and she has difficulty reaching overhead, and the overhead work reaching in the [place], lifting packages etc. exacerbates her symptoms.  It also increases the symptoms of numbness and paresthesias in both hands.  She has tried anti-inflammatory medication, activity restrictions as much as possible, and anti-inflammatory medications, no significant improvement.    Past medical history is significant for ovarian cancer recovery, otherwise noncontributory    Review of systems: Patient denies any recent history which show loss, fever chills or other constitutional symptoms.  HEENT: No changes chest: No changes cardiovascular: No changes GI: No changes : No changes neurologic: No changes musculoskeletal: See present complaint    Physical exam: The patient is an alert, healthy-appearing female in no obvious distress.    HEENT: Full range of motion cervical spine, cranial history through 12 intact.    Chest: Normal excursion and symmetry    Cardiovascular: Brisk refill pulses with regular rate and rhythm    Abdomen: Normal.    Musculoskeletal: Examination of both shoulders demonstrates mild tenderness over the acromioclavicular joint left, moderate tenderness over the acromial clear joint right with moderate osteophytosis.  There is tenderness over the coracoid process and over the long head of the biceps bilaterally, much worse on the right than the left.  Both shoulders demonstrate full range of motion but  "she is obviously uncomfortable bringing the right arm above approximately 90  of forward flexion or 90  of abduction.  Strength testing demonstrates 5 over 5 motor strength in internal rotation and external rotation bilaterally 5 over 5 motor strength in the left arm with forward flexion and empty can test, 3+ to 4 minus in the right shoulder.  Examination of both hands demonstrates a normal Hernandez's exam with good capillary refill as well.  Phalen's exam as well as Tinel's exam is positive bilaterally.  There is no muscle atrophy of the thenar our interosseous muscles.  X-rays of the right shoulder demonstrate moderately severe acromioclavicular joint arthritis, mild acromioclavicular joint arthritis on the left.  Assessment and plan: I believe the patient has impingement and probably a rotator cuff tear right shoulder, and mild impingement in the left shoulder.  She has symptoms of carpal tunnel syndrome in both hands.  The plan is to have an MRI of her right shoulder, I don't think the symptoms in the left shoulder warrant this intervention as yet, and also to have a neurology consult with EMGs and see these done to assess the health of her median nerve in both hands.  She'll follow-up once these tests are completed./87 (BP Location: Left arm, Patient Position: Sitting, Cuff Size: Adult Large)  Pulse 76  Temp 99.2  F (37.3  C) (Tympanic)  Resp 18  Ht 5' 1\" (1.549 m)  Wt 140 lb (63.5 kg)  SpO2 96%  BMI 26.45 kg/m2  "

## 2017-09-20 NOTE — MR AVS SNAPSHOT
After Visit Summary   9/20/2017    Madeleine Allen    MRN: [id number]           Patient Information     Date Of Birth          1960        Visit Information        Provider Department      9/20/2017 9:20 AM Chad Monroy DO [facility]        Today's Diagnoses     Bilateral hand pain    -  1    Incomplete rotator cuff tear or rupture of right shoulder, not specified as traumatic           Follow-ups after your visit        Additional Services     NEUROLOGY ADULT REFERRAL       Your provider has referred you for the following:   Consult at [facility]  Bilateral EMG    Please be aware that coverage of these services is subject to the terms and limitations of your health insurance plan.  Call member services at your health plan with any benefit or coverage questions.      Please bring the following with you to your appointment:    (1) Any X-Rays, CTs or MRIs which have been performed.  Contact the facility where they were done to arrange for  prior to your scheduled appointment.    (2) List of current medications  (3) This referral request   (4) Any documents/labs given to you for this referral                  Who to contact     If you have questions or need follow up information about today's clinic visit or your schedule please contact [facility] directly at [phone].  Normal or non-critical lab and imaging results will be communicated to you by MyChart, letter or phone within 4 business days after the clinic has received the results. If you do not hear from us within 7 days, please contact the clinic through MyChart or phone. If you have a critical or abnormal lab result, we will notify you by phone as soon as possible.  Submit refill requests through [email or web address] or call your pharmacy and they will forward the refill request to us. Please allow 3 business days for your refill to be completed.          Additional Information About Your Visit        MyChart  "Information     Fred gives you secure access to your electronic health record. If you see a primary care provider, you can also send messages to your care team and make appointments. If you have questions, please call your primary care clinic.  If you do not have a primary care provider, please call [phone] and they will assist you.        Care EveryWhere ID     This is your Care EveryWhere ID. This could be used by other organizations to access your [place] medical records  [id number]        Your Vitals Were     Pulse Temperature Respirations Height Pulse Oximetry BMI (Body Mass Index)    76 99.2  F (37.3  C) (Tympanic) 18 5' 1\" (1.549 m) 96% 26.45 kg/m2       Blood Pressure from Last 3 Encounters:   09/20/17 142/87   09/13/17 128/80   09/02/17 155/76    Weight from Last 3 Encounters:   09/20/17 140 lb (63.5 kg)   09/13/17 139 lb (63 kg)   01/13/17 146 lb 11.2 oz (66.5 kg)              We Performed the Following     MR Shoulder Right w/o Contrast     NEUROLOGY ADULT REFERRAL     XR HAND LT G/E 3 VW (Clinic Performed)     XR HAND RT G/E 3 VW (Clinic Performed)     XR SHOULDER LEFT G/E 2 VIEWS (Clinic Performed)        Primary Care Provider Office Phone # Fax #    Nicole MorganHECTOR coleman [phone]        [facility] [address] [place] [address]        Equal Access to Services     ASPEN MASTERS AH: Hadii aad ku hadasho Soomaali, waaxda luqadaha, qaybta kaalmada adeegyada, zhang corona. So [facility] [phone].    ATENCIÓN: Si habla español, tiene a casarez disposición servicios gratuitos de asistencia lingüística. Fela al [phone].    We comply with applicable federal civil rights laws and [place] laws. We do not discriminate on the basis of race, color, national origin, age, disability sex, sexual orientation or gender identity.            Thank you!     Thank you for choosing [facility]  for your care. Our goal is always to provide you with " excellent care. Hearing back from our patients is one way we can continue to improve our services. Please take a few minutes to complete the written survey that you may receive in the mail after your visit with us. Thank you!             Your Updated Medication List - Protect others around you: Learn how to safely use, store and throw away your medicines at www.disposemymeds.org.          This list is accurate as of: 9/20/17 12:40 PM.  Always use your most recent med list.                   Brand Name Dispense Instructions for use Diagnosis    aspirin 81 MG tablet      Take by mouth daily        B-12 100 MCG Tabs           MULTI VITAMIN PO      Take  by mouth one time a day as needed.        PRILOSEC PO      20 mg        sertraline 50 MG tablet    ZOLOFT    135 tablet    Take 1.5 tablets (75 mg) by mouth daily Further refills after upcoming appointment on 4/29/16.    Moderate episode of recurrent major depressive disorder (H)       UNABLE TO FIND     30 each    Lidoderm patch q 12 hours as needed    Chronic midline low back pain with sciatica, sciatica laterality unspecified       VITAMIN C PO      daily.        VITAMIN D (CHOLECALCIFEROL) PO      Take 1,000 Units by mouth daily        ZINC PO      daily.

## 2017-09-28 ENCOUNTER — TELEPHONE (OUTPATIENT)
Dept: ORTHOPEDICS | Facility: OTHER | Age: 57
End: 2017-09-28

## 2017-09-28 NOTE — TELEPHONE ENCOUNTER
Patient advise that we are unable to fill out [facility] paperwork due to Dr. Monroy never took patient out of work we do consider the fact after her office visit that being she has a repetitive job her issue with shoulders and wrists could be work related. Patient advised writer to return (mail back [place] ) paperwork and she will send out to her Cardiologist who took her out of work and placed her on restrictions. Patient also advised writer that her both feet are tingling and numbness and can she get he feet done when she has her EMG patient advised she really needs to speak with he WC and add it to this WC claim and that we still would need to see her for this issue so at this time we will not be addressing her feet.Patient appeared to understand. All paperwork given back to our WC ( Bessy ) to return to patient and [employer].  Shanti Alfaro LPN

## 2017-10-05 ENCOUNTER — TELEPHONE (OUTPATIENT)
Dept: ORTHOPEDICS | Facility: OTHER | Age: 57
End: 2017-10-05

## 2017-10-05 NOTE — TELEPHONE ENCOUNTER
We are requesting advance care approval for NEUROLOGY Dr. Chen for EMG, AND MRI RT SHOULDER. Please see office notes dated 09/20/17 by Dr. Monroy. X-ray report dated 09/20/17.

## 2017-10-16 ENCOUNTER — TELEPHONE (OUTPATIENT)
Dept: FAMILY MEDICINE | Facility: OTHER | Age: 57
End: 2017-10-16

## 2017-10-16 DIAGNOSIS — M79.671 FOOT PAIN, BILATERAL: Primary | ICD-10-CM

## 2017-10-16 DIAGNOSIS — M79.672 FOOT PAIN, BILATERAL: Primary | ICD-10-CM

## 2017-10-16 NOTE — TELEPHONE ENCOUNTER
Reason for call:  REFERRAL   1. Concern: fibromyalgia, aneurysm  2. Have you seen a provider for this concern? Yes  3. Who? Nicole Carbajal  4. When? Last appt 09/13/17  5. What services are you requesting? Internal Medicine  Description: Patient would like referral to see internal medicine. Per her disability they are requiring her to see a doctor for long term care. Patient would like to see Dr. Law if possible.  Was an appointment offered for this a call? No  If Yes:  Appointment type                Date    Preferred method for responding to this message: Telephone Call  What is your phone number ?    If we cannot reach you directly, may we leave a detailed response at the number you provided? Yes  Can this message wait until your PCP/Provider returns if not available today? Yes

## 2017-10-17 ENCOUNTER — RADIANT APPOINTMENT (OUTPATIENT)
Dept: GENERAL RADIOLOGY | Facility: OTHER | Age: 57
End: 2017-10-17
Attending: ORTHOPAEDIC SURGERY
Payer: COMMERCIAL

## 2017-10-17 ENCOUNTER — OFFICE VISIT (OUTPATIENT)
Dept: ORTHOPEDICS | Facility: OTHER | Age: 57
End: 2017-10-17
Attending: ORTHOPAEDIC SURGERY
Payer: COMMERCIAL

## 2017-10-17 VITALS
BODY MASS INDEX: 26.43 KG/M2 | HEIGHT: 61 IN | HEART RATE: 70 BPM | SYSTOLIC BLOOD PRESSURE: 128 MMHG | OXYGEN SATURATION: 99 % | WEIGHT: 140 LBS | TEMPERATURE: 98 F | RESPIRATION RATE: 18 BRPM | DIASTOLIC BLOOD PRESSURE: 82 MMHG

## 2017-10-17 DIAGNOSIS — R20.2 NUMBNESS AND TINGLING OF BOTH FEET: Primary | ICD-10-CM

## 2017-10-17 DIAGNOSIS — R20.0 NUMBNESS AND TINGLING OF BOTH FEET: Primary | ICD-10-CM

## 2017-10-17 PROCEDURE — 99213 OFFICE O/P EST LOW 20 MIN: CPT | Performed by: ORTHOPAEDIC SURGERY

## 2017-10-17 PROCEDURE — 73630 X-RAY EXAM OF FOOT: CPT | Mod: TC

## 2017-10-17 ASSESSMENT — PAIN SCALES - GENERAL: PAINLEVEL: NO PAIN (0)

## 2017-10-17 NOTE — PROGRESS NOTES
"Patient presents today to inform me that she has converted her initial visit to work comp visit and this is been approved, that was for right shoulder pain for suspected rotator cuff tear and bilateral carpal tunnel syndrome or neuralgia.  She is also wondering if her feet can be involved in some type of compressive process as she frequently gets similar numbness over the feet.  She states after she's been standing for a while or if she is sitting for any prolonged period time with her feet in dependent position but the feet will go numb usually over the first 3 toes including the great toe but occasionally all the toes and the dorsum of the foot.  She does not have diabetes, but does have some other medical issues which could contribute to neuropathy.  She was informed that she has appointment with Dr. Chen requested and that the EMGs and nerve conduction studies will often demonstrate if there is a generalized nerve problem such as a neuropathy as well as a curved peripheral neuropathy.    Physical exam: Both feet are examined, the skin appears normal, sensation today to light touch joint position and pressure is normal, patient has a well-developed arch in which is maintained on standing and normal hindfoot motion.  She is able to demonstrate heel and toe walking with no difficulty.  Dorsalis pedis and posterior tibialis pulses are both +2 in both feet, and there is no venous distention or signs of insufficiency.  X-rays demonstrate no particular bony abnormality.    Assessment: This may be a compression related or a peripheral neuropathy, we will await the findings of the EMG to see if there is a generalized problem in the meantime she continue symptomatic treatment and avoid aggravating activity./82 (BP Location: Left arm, Patient Position: Chair, Cuff Size: Adult Regular)  Pulse 70  Temp 98  F (36.7  C)  Resp 18  Ht 5' 1\" (1.549 m)  Wt 140 lb (63.5 kg)  SpO2 99%  BMI 26.45 kg/m2  "

## 2017-10-17 NOTE — NURSING NOTE
"Chief Complaint   Patient presents with     Musculoskeletal Problem     bilateral feet tingling        Initial /82 (BP Location: Left arm, Patient Position: Chair, Cuff Size: Adult Regular)  Pulse 70  Temp 98  F (36.7  C)  Resp 18  Ht 5' 1\" (1.549 m)  Wt 140 lb (63.5 kg)  SpO2 99%  BMI 26.45 kg/m2 Estimated body mass index is 26.45 kg/(m^2) as calculated from the following:    Height as of this encounter: 5' 1\" (1.549 m).    Weight as of this encounter: 140 lb (63.5 kg).  Medication Reconciliation: complete   Pamela M Lechevalier LPN      "

## 2017-10-17 NOTE — TELEPHONE ENCOUNTER
Notified via phone call and transferred to scheduling for an appointment with primary to discuss referral

## 2017-10-17 NOTE — MR AVS SNAPSHOT
After Visit Summary   10/17/2017    Madeleine Allen    MRN: [id number]           Patient Information     Date Of Birth          1960        Visit Information        Provider Department      10/17/2017 2:20 PM Chad Monroy, DO  ORTHOPEDICS         Follow-ups after your visit        Your next 10 appointments already scheduled     Oct 25, 2017  3:45 PM CDT   (Arrive by 3:30 PM)   SHORT with Valentine Dumont NP   [facility] ([facility] )    [address]  [facility] [address]   [phone]              Who to contact     If you have questions or need follow up information about today's clinic visit or your schedule please contact  ORTHOPEDICS directly at [phone].  Normal or non-critical lab and imaging results will be communicated to you by MyChart, letter or phone within 4 business days after the clinic has received the results. If you do not hear from us within 7 days, please contact the clinic through MyChart or phone. If you have a critical or abnormal lab result, we will notify you by phone as soon as possible.  Submit refill requests through Linkago[employer] or call your pharmacy and they will forward the refill request to us. Please allow 3 business days for your refill to be completed.          Additional Information About Your Visit        MyChart Information     [employer] gives you secure access to your electronic health record. If you see a primary care provider, you can also send messages to your care team and make appointments. If you have questions, please call your primary care clinic.  If you do not have a primary care provider, please call [phone] and they will assist you.        Care EveryWhere ID     This is your Care EveryWhere ID. This could be used by other organizations to access your [place] medical records  [id number]        Your Vitals Were     Pulse Temperature Respirations Height Pulse Oximetry BMI (Body Mass  "Index)    70 98  F (36.7  C) 18 5' 1\" (1.549 m) 99% 26.45 kg/m2       Blood Pressure from Last 3 Encounters:   10/17/17 128/82   09/20/17 142/87   09/13/17 128/80    Weight from Last 3 Encounters:   10/17/17 140 lb (63.5 kg)   09/20/17 140 lb (63.5 kg)   09/13/17 139 lb (63 kg)              Today, you had the following     No orders found for display       Primary Care Provider Office Phone # Fax #    Nicole Carbajal HECTOR [phone]        [facility] [address] [place] [address]        Equal Access to Services     ASPEN MASTERS : Henry Aden, sailaja quintanilla, annabel kaalmajo morton, zhang corona. So [facility] [phone].    ATENCIÓN: Si habla español, tiene a casarez disposición servicios gratuitos de asistencia lingüística. Llame al [phone].    We comply with applicable federal civil rights laws and [place] laws. We do not discriminate on the basis of race, color, national origin, age, disability, sex, sexual orientation, or gender identity.            Thank you!     Thank you for choosing  ORTHOPEDICS  for your care. Our goal is always to provide you with excellent care. Hearing back from our patients is one way we can continue to improve our services. Please take a few minutes to complete the written survey that you may receive in the mail after your visit with us. Thank you!             Your Updated Medication List - Protect others around you: Learn how to safely use, store and throw away your medicines at www.disposemymeds.org.          This list is accurate as of: 10/17/17  3:52 PM.  Always use your most recent med list.                   Brand Name Dispense Instructions for use Diagnosis    aspirin 81 MG tablet      Take by mouth daily        B-12 100 MCG Tabs           MULTI VITAMIN PO      Take  by mouth one time a day as needed.        PRILOSEC PO      20 mg        sertraline 50 MG tablet    ZOLOFT    135 tablet    Take 1.5 tablets (75 " mg) by mouth daily Further refills after upcoming appointment on 4/29/16.    Moderate episode of recurrent major depressive disorder (H)       UNABLE TO FIND     30 each    Lidoderm patch q 12 hours as needed    Chronic midline low back pain with sciatica, sciatica laterality unspecified       VITAMIN C PO      daily.        VITAMIN D (CHOLECALCIFEROL) PO      Take 1,000 Units by mouth daily        ZINC PO      daily.

## 2017-10-18 ENCOUNTER — MYC MEDICAL ADVICE (OUTPATIENT)
Dept: ORTHOPEDICS | Facility: OTHER | Age: 57
End: 2017-10-18

## 2017-10-25 ENCOUNTER — OFFICE VISIT (OUTPATIENT)
Dept: FAMILY MEDICINE | Facility: OTHER | Age: 57
End: 2017-10-25
Attending: NURSE PRACTITIONER
Payer: COMMERCIAL

## 2017-10-25 VITALS
HEIGHT: 61 IN | SYSTOLIC BLOOD PRESSURE: 152 MMHG | WEIGHT: 140 LBS | HEART RATE: 82 BPM | BODY MASS INDEX: 26.43 KG/M2 | RESPIRATION RATE: 18 BRPM | OXYGEN SATURATION: 95 % | DIASTOLIC BLOOD PRESSURE: 88 MMHG | TEMPERATURE: 99.4 F

## 2017-10-25 DIAGNOSIS — Z72.0 TOBACCO ABUSE: ICD-10-CM

## 2017-10-25 DIAGNOSIS — J40 BRONCHITIS: Primary | ICD-10-CM

## 2017-10-25 DIAGNOSIS — Z71.6 TOBACCO ABUSE COUNSELING: ICD-10-CM

## 2017-10-25 PROCEDURE — 99213 OFFICE O/P EST LOW 20 MIN: CPT | Performed by: NURSE PRACTITIONER

## 2017-10-25 RX ORDER — AZITHROMYCIN 250 MG/1
TABLET, FILM COATED ORAL
Qty: 8 TABLET | Refills: 0 | Status: SHIPPED | OUTPATIENT
Start: 2017-10-25 | End: 2018-01-08

## 2017-10-25 RX ORDER — ALBUTEROL SULFATE 90 UG/1
2 AEROSOL, METERED RESPIRATORY (INHALATION) EVERY 6 HOURS PRN
Qty: 1 INHALER | Refills: 0 | Status: SHIPPED | OUTPATIENT
Start: 2017-10-25

## 2017-10-25 ASSESSMENT — ANXIETY QUESTIONNAIRES
4. TROUBLE RELAXING: MORE THAN HALF THE DAYS
IF YOU CHECKED OFF ANY PROBLEMS ON THIS QUESTIONNAIRE, HOW DIFFICULT HAVE THESE PROBLEMS MADE IT FOR YOU TO DO YOUR WORK, TAKE CARE OF THINGS AT HOME, OR GET ALONG WITH OTHER PEOPLE: EXTREMELY DIFFICULT
1. FEELING NERVOUS, ANXIOUS, OR ON EDGE: NEARLY EVERY DAY
3. WORRYING TOO MUCH ABOUT DIFFERENT THINGS: NEARLY EVERY DAY
5. BEING SO RESTLESS THAT IT IS HARD TO SIT STILL: NOT AT ALL
GAD7 TOTAL SCORE: 15
6. BECOMING EASILY ANNOYED OR IRRITABLE: SEVERAL DAYS
7. FEELING AFRAID AS IF SOMETHING AWFUL MIGHT HAPPEN: NEARLY EVERY DAY
2. NOT BEING ABLE TO STOP OR CONTROL WORRYING: NEARLY EVERY DAY

## 2017-10-25 ASSESSMENT — PATIENT HEALTH QUESTIONNAIRE - PHQ9: SUM OF ALL RESPONSES TO PHQ QUESTIONS 1-9: 20

## 2017-10-25 ASSESSMENT — PAIN SCALES - GENERAL: PAINLEVEL: WORST PAIN (10)

## 2017-10-25 NOTE — PROGRESS NOTES
SUBJECTIVE:   Madeleine Allen is a 57 year old female who presents to clinic today for the following health issues:  Referral for Internal Med and URI    Acute Illness   Acute illness concerns: shortness of breath with cough  Onset: sunday    Fever: YES    Chills/Sweats: YES    Headache (location?): YES    Sinus Pressure:no    Conjunctivitis:  no    Ear Pain: no    Rhinorrhea: YES    Congestion: YES    Sore Throat: YES     Cough: YES-productive of yellow sputum, with shortness of breath    Wheeze: YES    Decreased Appetite: YES    Nausea: YES    Vomiting: no     Diarrhea:  no     Dysuria/Freq.: no     Fatigue/Achiness: YES    Sick/Strep Exposure: no      Therapies Tried and outcome: ibuprofen and increased fluids. August seen in [facility]. On antibiotic for 10 days    She is attempting disability for aortic aneurysm, fibromyalgia.  We have no records here for her aneurysm - has been following with [facility].  Would like a local physician to help her through this process.        Problem list and histories reviewed & adjusted, as indicated.  Additional history: as documented    Patient Active Problem List   Diagnosis     YAZ III (vulvar intraepithelial neoplasia III)     Tobacco abuse     History of ovarian cancer     Hypertrophy of nasal turbinates     DNS (deviated nasal septum)     Vitamin D deficiency     Gastroesophageal reflux disease without esophagitis     Hiatal hernia     Reed's esophagus without dysplasia     ACP (advance care planning)     Past Surgical History:   Procedure Laterality Date     ABDOMEN SURGERY  1962    BLOCKED INTESTINE FOOT AND HALF REMOVED OF INTESTINE     BIOPSY SKIN (LOCATION)  2/21/2014    Procedure: BIOPSY SKIN (LOCATION);;  Surgeon: Jackie Galindo MD;  Location: [facility]     breast implants      bilateral     CHOLECYSTECTOMY  11/2016    laparoscopic choley     COLONOSCOPY  5/21/2015     ENT SURGERY       ESOPHAGOSCOPY, GASTROSCOPY, DUODENOSCOPY (EGD), COMBINED N/A 5/13/2016     Procedure: COMBINED ESOPHAGOSCOPY, GASTROSCOPY, DUODENOSCOPY (EGD);  Surgeon: Irvin Bolivar DO;  Location: [facility]     ESOPHAGOSCOPY, GASTROSCOPY, DUODENOSCOPY (EGD), COMBINED N/A 7/11/2016    Procedure: COMBINED ESOPHAGOSCOPY, GASTROSCOPY, DUODENOSCOPY (EGD), BIOPSY SINGLE OR MULTIPLE;  Surgeon: Wesly Webb MD;  Location:  GI     GYN SURGERY  06/13/2017    laser vulva     HC ESOPH/GAS REFLUX TEST W NASAL IMPED >1 HR N/A 7/12/2016    Procedure: ESOPHAGEAL IMPEDENCE FUNCTION TEST WITH 24 HOUR PH GREATER THAN 1 HOUR;  Surgeon: Wesly Webb MD;  Location:  GI     HYSTERECTOMY TOTAL ABD, DEANGELO SALPINGO-OOPHORECTOMY, NODE DISSECTION, TUMOR DEBULKING, COMBINED  2001    [facility] gyn onc - ovarian cancer     LASER CO2 VULVA  2005    [facility] gyn onc     LASER CO2 VULVA  1/29/2013    Procedure: LASER CO2 VULVA;  Right labial minora biopsy, perineal biopsy, guerrero-anal cautery;  Surgeon: Jackie Galindo MD;  [facility]     LASER CO2 VULVA  2/21/2014    Procedure: LASER CO2 VULVA;  Laser To Vulva,       LASER CO2 VULVA N/A 5/29/2015    Procedure: LASER CO2 VULVA;  Surgeon: Jackie Galindo MD;  [facility]     RECONSTRUCT BREAST BILATERAL, IMPLANT PROSTHESIS BILATERAL, COMBINED  2001 and 2007    enlargement     surgically removed  1962    intestinal blockage       Social History   Substance Use Topics     Smoking status: Current Every Day Smoker     Packs/day: 1.00     Years: 40.00     Types: Cigarettes     Start date: 1/1/2016     Smokeless tobacco: Never Used      Comment: planning in april     Alcohol use No      Comment: rare- social     Family History   Problem Relation Age of Onset     Connective Tissue Disorder Mother      Radha Gehrig Disease     Other - See Comments Mother 76     ALS     Hypertension Mother      Hyperlipidemia Mother      Genetic Disorder Mother      ALS     CANCER Brother 62     lung     Cardiovascular Brother 53     aortic valve disease     Alcohol/Drug Father       Alcohol abuse from post traumatic stress dysorder-war vet     Other - See Comments Sister      chronic fatigue     Cardiovascular Sister      Hypertension Sister      Other Cancer Brother      LUNG CANCER      Depression Sister      CHRONIC FATIGUE SYNDROME WHICH BRINGS ON DEPRESSIO     MENTAL ILLNESS Nephew      SCHIZOPHRENIA     Asthma No family hx of      Thyroid Disease No family hx of      C.A.D. No family hx of          Current Outpatient Prescriptions   Medication Sig Dispense Refill     COENZYME Q-10 PO Take 50 mg by mouth daily       Multiple Vitamin (MULTI VITAMIN PO) Take  by mouth one time a day as needed.       Cyanocobalamin (B-12) 100 MCG TABS        aspirin 81 MG tablet Take by mouth daily       sertraline (ZOLOFT) 50 MG tablet Take 1.5 tablets (75 mg) by mouth daily Further refills after upcoming appointment on 16. 135 tablet 1     UNABLE TO FIND Lidoderm patch q 12 hours as needed 30 each 3     VITAMIN D, CHOLECALCIFEROL, PO Take 1,000 Units by mouth daily        Omeprazole (PRILOSEC PO) 20 mg       Ascorbic Acid (VITAMIN C PO) daily.       Multiple Vitamins-Minerals (ZINC PO) daily.       Allergies   Allergen Reactions     Bupropion Palpitations     Recent Labs   Lab Test  17   0800 17   1135   07/08/10   1143   HDL   --    --    --    --   43*   ALT  27  35  33   < >   --    CR  0.57  0.56  0.57*  0.50*   < >   --    GFRESTIMATED  >90  Non  GFR Calc    >90  Non  GFR Calc     --   >90  Non  GFR Calc     < >   --    GFRESTBLACK  >90   GFR Calc    >90   GFR Calc     --   >90   GFR Calc     < >   --    POTASSIUM  3.9  3.9  3.8  4.1   < >   --    TSH  1.65  1.190   --    < >   --     < > = values in this interval not displayed.      BP Readings from Last 3 Encounters:   10/25/17 152/88   10/17/17 128/82   17 142/87    Wt Readings from Last 3 Encounters:  "  10/25/17 140 lb (63.5 kg)   10/17/17 140 lb (63.5 kg)   09/20/17 140 lb (63.5 kg)            Reviewed and updated as needed this visit by clinical staffTobacco  Allergies       Reviewed and updated as needed this visit by Provider         ROS:  Constitutional, HEENT, cardiovascular, pulmonary, gi and gu systems are negative, except as otherwise noted.      OBJECTIVE:   /88 (BP Location: Right arm, Patient Position: Chair, Cuff Size: Adult Regular)  Pulse 82  Temp 99.4  F (37.4  C) (Tympanic)  Resp 18  Ht 5' 1\" (1.549 m)  Wt 140 lb (63.5 kg)  SpO2 95%  BMI 26.45 kg/m2  Body mass index is 26.45 kg/(m^2).  GENERAL: healthy, alert and no distress  EYES: Eyes grossly normal to inspection, PERRL and conjunctivae and sclerae normal  HENT: ear canals and TM's normal, nose and mouth without ulcers or lesions  NECK: no adenopathy, no asymmetry, masses, or scars and thyroid normal to palpation  RESP: wheezing throughout with bronchospasm.    CV: regular rate and rhythm, normal S1 S2, no S3 or S4, no murmur, click or rub, no peripheral edema and peripheral pulses strong  MS: no gross musculoskeletal defects noted, no edema  PSYCH: mentation appears normal, affect normal/bright        ASSESSMENT/PLAN:       1. Tobacco abuse  Cessation encouraged  - Tobacco Cessation - for Health Maintenance    2. Tobacco abuse counseling    3. Bronchitis  symptomatic  - albuterol (PROAIR HFA/PROVENTIL HFA/VENTOLIN HFA) 108 (90 BASE) MCG/ACT Inhaler; Inhale 2 puffs into the lungs every 6 hours as needed for shortness of breath / dyspnea or wheezing  Dispense: 1 Inhaler; Refill: 0  - budesonide (PULMICORT FLEXHALER) 180 MCG/ACT inhaler; Inhale 2 puffs into the lungs 2 times daily  Dispense: 1 Inhaler; Refill: 1  - azithromycin (ZITHROMAX) 250 MG tablet; Two tablets first day, then one tablet daily for 6 days.  Dispense: 8 tablet; Refill: 0    Searched our records, we do not have any record of aneurysm or notes from [facility].  " It would be difficult to assist her in this effort as we do not have documented plan of care.  Encouraged  Her to check with [place] for assistance at this time.      FUTURE APPOINTMENTS:       - Follow-up visit if no improvement    Valentine Dumont NP  [facility]

## 2017-10-25 NOTE — PATIENT INSTRUCTIONS
ASSESSMENT/PLAN:       1. Tobacco abuse  Cessation encouraged  - Tobacco Cessation - for Health Maintenance    2. Tobacco abuse counseling    3. Bronchitis  symptomatic  - albuterol (PROAIR HFA/PROVENTIL HFA/VENTOLIN HFA) 108 (90 BASE) MCG/ACT Inhaler; Inhale 2 puffs into the lungs every 6 hours as needed for shortness of breath / dyspnea or wheezing  Dispense: 1 Inhaler; Refill: 0  - budesonide (PULMICORT FLEXHALER) 180 MCG/ACT inhaler; Inhale 2 puffs into the lungs 2 times daily  Dispense: 1 Inhaler; Refill: 1  - azithromycin (ZITHROMAX) 250 MG tablet; Two tablets first day, then one tablet daily for 6 days.  Dispense: 8 tablet; Refill: 0    FUTURE APPOINTMENTS:       - Follow-up visit if no improvement    Valentine Dumont, NP  [facility]      HOW TO QUIT SMOKING  Smoking is one of the hardest habits to break. About half of all those who have ever smoked have been able to quit, and most of those (about 70%) who still smoke want to quit. Here are some of the best ways to stop smoking.     KEEP TRYING:  It takes most smokers about 8 tries before they are finally able to fully quit. So, the more often you try and fail, the better your chance of quitting the next time! So, don't give up!    GO COLD TURKEY:  Most ex-smokers quit cold turkey. Trying to cut back gradually doesn't seem to work as well, perhaps because it continues the smoking habit. Also, it is possible to fool yourself by inhaling more while smoking fewer cigarettes. This results in the same amount of nicotine in your body!    GET SUPPORT:  Support programs can make an important difference, especially for the heavy smoker. These groups offer lectures, methods to change your behavior and peer support. Call the free national Quitline for more information. 800-QUIT-NOW (410-955-3864). Low-cost or free programs are offered by many hospitals, local chapters of the American Lung Association (928-882-9597) and the American Cancer Society  (614.741.5472). Support at home is important too. Non-smokers can help by offering praise and encouragement. If the smoker fails to quit, encourage them to try again!    OVER-THE-COUNTER MEDICINES:  For those who can't quit on their own, Nicotine Replacement Therapy (NRT) may make quitting much easier. Certain aids such as the nicotine patch, gum and lozenge are available without a prescription. However, it is best to use these under the guidance of your doctor. The skin patch provides a steady supply of nicotine to the body. Nicotine gum and lozenge gives temporary bursts of low levels of nicotine. Both methods take the edge off the craving for cigarettes. WARNING: If you feel symptoms of nicotine overdose, such as nausea, vomiting, dizziness, weakness, or fast heartbeat, stop using these and see your doctor.    PRESCRIPTION MEDICINES:  After evaluating your smoking patterns and prior attempts at quitting, your doctor may offer a prescription medicine such as bupropion (Zyban, Wellbutrin), varenicline (Chantix, Champix), a niocotine inhaler or nasal spray. Each has its unique advantage and side effects which your doctor can review with you.    HEALTH BENEFITS OF QUITTING:  The benefits of quitting start right away and keep improving the longer you go without smokin minutes: blood pressure and pulse return to normal  8 hours: oxygen levels return to normal  2 days: ability to smell and taste begins to improve as damaged nerves start to regrow  2-3 weeks: circulation and lung function improves  1-9 months: decreased cough, congestion and shortness of breath; less tired  1 year: risk of heart attack decreases by half  5 years: risk of lung cancer decreases by half; risk of stroke becomes the same as a non-smoker  For information about how to quit smoking, visit the following links:  National Cancer [place] ,   Clearing the Air, Quit Smoking Today   - an online booklet.  http://www.smokefree.gov/pubs/clearing_the_air.pdf  Smokefree.gov http://smokefree.gov/  QuitNet http://www.quitnet.com/    9224-9584 Patricia Siegel, [address] [place] [address], [place] [address]. All rights reserved. This information is not intended as a substitute for professional medical care. Always follow your healthcare professional's instructions.    The Benefits of Living Smoke Free  What do you want to gain from quitting? Check off some reasons to quit.  Health Benefits  ___ Reduce my risk of lung cancer, heart disease, chronic lung disease  ___ Have fewer wrinkles and softer skin  ___ Improve my sense of taste and smell  ___ For pregnant women reduce the risk of having a miscarriage, stillbirth, premature birth, or low-birth-weight baby  Personal Benefits  ___ Feel more in control of my life  ___ Have better-smelling hair, breath, clothes, home, and car  ___ Save time by not having to take smoke breaks, buy cigarettes, or hunt for a light  ___ Have whiter teeth  Family Benefits  ___ Reduce my children s respiratory tract infections  ___ Set a good example for my children  ___ Reduce my family s cancer risk  Financial Benefits  ___ Save hundreds of dollars each year that would be spent on cigarettes  ___ Save money on medical bills  ___ Save on life, health, and car insurance premiums    Those Dollars Add Up!  Cigarettes are expensive, and getting more expensive all the time. Do you realize how much money you are spending on cigarettes per year? What is the average amount you spend on a pack of cigarettes? What is the average number of packs that you smoke per day? Using your answers to these questions, fill in this formula to help you find out:  ($ _____ per pack) ×  ( _____ number of packs per day) × (365 days) =  $ _____ yearly cost of smoking  Besides tobacco, there are other costs, including extra cleaning bills and replacement costs for clothing and furniture; medical expenses for smoking-related  illnesses; and higher health, life, and car insurance premiums.    Cigars and Pipes Count Too!  Cigars and pipes are also dangerous. So are smokeless (chewing) tobacco and snuff. All of these products contain nicotine, a highly addictive substance that has harmful effects on your body. Quitting smoking means giving up all tobacco products.      7020-1343 Patricia Siegel, [address] [place] [address], [place] [address]. All rights reserved. This information is not intended as a substitute for professional medical care. Always follow your healthcare professional's instructions.

## 2017-10-25 NOTE — MR AVS SNAPSHOT
After Visit Summary   10/25/2017    Madeleine Allen    MRN: [id number]           Patient Information     Date Of Birth          1960        Visit Information        Provider Department      10/25/2017 3:45 PM Valentine Dumont NP [facility]        Today's Diagnoses     Bronchitis    -  1    Tobacco abuse        Tobacco abuse counseling          Care Instructions      ASSESSMENT/PLAN:       1. Tobacco abuse  Cessation encouraged  - Tobacco Cessation - for Health Maintenance    2. Tobacco abuse counseling    3. Bronchitis  symptomatic  - albuterol (PROAIR HFA/PROVENTIL HFA/VENTOLIN HFA) 108 (90 BASE) MCG/ACT Inhaler; Inhale 2 puffs into the lungs every 6 hours as needed for shortness of breath / dyspnea or wheezing  Dispense: 1 Inhaler; Refill: 0  - budesonide (PULMICORT FLEXHALER) 180 MCG/ACT inhaler; Inhale 2 puffs into the lungs 2 times daily  Dispense: 1 Inhaler; Refill: 1  - azithromycin (ZITHROMAX) 250 MG tablet; Two tablets first day, then one tablet daily for 6 days.  Dispense: 8 tablet; Refill: 0    FUTURE APPOINTMENTS:       - Follow-up visit if no improvement    Valentine Dumont NP  [facility]      HOW TO QUIT SMOKING  Smoking is one of the hardest habits to break. About half of all those who have ever smoked have been able to quit, and most of those (about 70%) who still smoke want to quit. Here are some of the best ways to stop smoking.     KEEP TRYING:  It takes most smokers about 8 tries before they are finally able to fully quit. So, the more often you try and fail, the better your chance of quitting the next time! So, don't give up!    GO COLD TURKEY:  Most ex-smokers quit cold turkey. Trying to cut back gradually doesn't seem to work as well, perhaps because it continues the smoking habit. Also, it is possible to fool yourself by inhaling more while smoking fewer cigarettes. This results in the same amount of nicotine in your body!    GET  SUPPORT:  Support programs can make an important difference, especially for the heavy smoker. These groups offer lectures, methods to change your behavior and peer support. Call the free national Quitline for more information. 800-QUIT-NOW (375-683-9898). Low-cost or free programs are offered by many hospitals, local chapters of the American Lung Association (243-985-5513) and the American Cancer Society (529-052-8092). Support at home is important too. Non-smokers can help by offering praise and encouragement. If the smoker fails to quit, encourage them to try again!    OVER-THE-COUNTER MEDICINES:  For those who can't quit on their own, Nicotine Replacement Therapy (NRT) may make quitting much easier. Certain aids such as the nicotine patch, gum and lozenge are available without a prescription. However, it is best to use these under the guidance of your doctor. The skin patch provides a steady supply of nicotine to the body. Nicotine gum and lozenge gives temporary bursts of low levels of nicotine. Both methods take the edge off the craving for cigarettes. WARNING: If you feel symptoms of nicotine overdose, such as nausea, vomiting, dizziness, weakness, or fast heartbeat, stop using these and see your doctor.    PRESCRIPTION MEDICINES:  After evaluating your smoking patterns and prior attempts at quitting, your doctor may offer a prescription medicine such as bupropion (Zyban, Wellbutrin), varenicline (Chantix, Champix), a niocotine inhaler or nasal spray. Each has its unique advantage and side effects which your doctor can review with you.    HEALTH BENEFITS OF QUITTING:  The benefits of quitting start right away and keep improving the longer you go without smokin minutes: blood pressure and pulse return to normal  8 hours: oxygen levels return to normal  2 days: ability to smell and taste begins to improve as damaged nerves start to regrow  2-3 weeks: circulation and lung function improves  1-9 months:  decreased cough, congestion and shortness of breath; less tired  1 year: risk of heart attack decreases by half  5 years: risk of lung cancer decreases by half; risk of stroke becomes the same as a non-smoker  For information about how to quit smoking, visit the following links:  National Cancer [place] ,   Clearing the Air, Quit Smoking Today   - an online booklet. http://www.smokefree.gov/pubs/clearing_the_air.pdf  Smokefree.gov http://smokefree.gov/  QuitNet http://www.quitnet.com/    4603-5762 Patricia Siegel, [address] [place] [address], [place] [address]. All rights reserved. This information is not intended as a substitute for professional medical care. Always follow your healthcare professional's instructions.    The Benefits of Living Smoke Free  What do you want to gain from quitting? Check off some reasons to quit.  Health Benefits  ___ Reduce my risk of lung cancer, heart disease, chronic lung disease  ___ Have fewer wrinkles and softer skin  ___ Improve my sense of taste and smell  ___ For pregnant women--reduce the risk of having a miscarriage, stillbirth, premature birth, or low-birth-weight baby  Personal Benefits  ___ Feel more in control of my life  ___ Have better-smelling hair, breath, clothes, home, and car  ___ Save time by not having to take smoke breaks, buy cigarettes, or hunt for a light  ___ Have whiter teeth  Family Benefits  ___ Reduce my children s respiratory tract infections  ___ Set a good example for my children  ___ Reduce my family s cancer risk  Financial Benefits  ___ Save hundreds of dollars each year that would be spent on cigarettes  ___ Save money on medical bills  ___ Save on life, health, and car insurance premiums    Those Dollars Add Up!  Cigarettes are expensive, and getting more expensive all the time. Do you realize how much money you are spending on cigarettes per year? What is the average amount you spend on a pack of cigarettes? What is the average number of packs  that you smoke per day? Using your answers to these questions, fill in this formula to help you find out:  ($ _____ per pack) ×  ( _____ number of packs per day) × (365 days) =  $ _____ yearly cost of smoking  Besides tobacco, there are other costs, including extra cleaning bills and replacement costs for clothing and furniture; medical expenses for smoking-related illnesses; and higher health, life, and car insurance premiums.    Cigars and Pipes Count Too!  Cigars and pipes are also dangerous. So are smokeless (chewing) tobacco and snuff. All of these products contain nicotine, a highly addictive substance that has harmful effects on your body. Quitting smoking means giving up all tobacco products.      7536-2380 [facility], [address] [place] [address], [place] [address]. All rights reserved. This information is not intended as a substitute for professional medical care. Always follow your healthcare professional's instructions.          Follow-ups after your visit        Who to contact     If you have questions or need follow up information about today's clinic visit or your schedule please contact [facility] directly at [phone].  Normal or non-critical lab and imaging results will be communicated to you by [employer], letter or phone within 4 business days after the clinic has received the results. If you do not hear from us within 7 days, please contact the clinic through [employer] or phone. If you have a critical or abnormal lab result, we will notify you by phone as soon as possible.  Submit refill requests through [employer] or call your pharmacy and they will forward the refill request to us. Please allow 3 business days for your refill to be completed.          Additional Information About Your Visit        [employer] Information     [employer] gives you secure access to your electronic health record. If you see a primary care provider, you can also send messages to your care team and make  "appointments. If you have questions, please call your primary care clinic.  If you do not have a primary care provider, please call [phone] and they will assist you.        Care EveryWhere ID     This is your Care EveryWhere ID. This could be used by other organizations to access your [facility] medical records  [id number]        Your Vitals Were     Pulse Temperature Respirations Height Pulse Oximetry BMI (Body Mass Index)    82 99.4  F (37.4  C) (Tympanic) 18 5' 1\" (1.549 m) 95% 26.45 kg/m2       Blood Pressure from Last 3 Encounters:   10/25/17 152/88   10/17/17 128/82   09/20/17 142/87    Weight from Last 3 Encounters:   10/25/17 140 lb (63.5 kg)   10/17/17 140 lb (63.5 kg)   09/20/17 140 lb (63.5 kg)              We Performed the Following     Tobacco Cessation - for Health Maintenance          Today's Medication Changes          These changes are accurate as of: 10/25/17  4:22 PM.  If you have any questions, ask your nurse or doctor.               Start taking these medicines.        Dose/Directions    albuterol 108 (90 BASE) MCG/ACT Inhaler   Commonly known as:  PROAIR HFA/PROVENTIL HFA/VENTOLIN HFA   Used for:  Bronchitis   Started by:  Valentine Dumont NP        Dose:  2 puff   Inhale 2 puffs into the lungs every 6 hours as needed for shortness of breath / dyspnea or wheezing   Quantity:  1 Inhaler   Refills:  0       azithromycin 250 MG tablet   Commonly known as:  ZITHROMAX   Used for:  Bronchitis   Started by:  Valentine Dumont NP        Two tablets first day, then one tablet daily for 6 days.   Quantity:  8 tablet   Refills:  0       budesonide 180 MCG/ACT inhaler   Commonly known as:  PULMICORT FLEXHALER   Used for:  Bronchitis   Started by:  Valentine Dumont NP        Dose:  2 puff   Inhale 2 puffs into the lungs 2 times daily   Quantity:  1 Inhaler   Refills:  1            Where to get your medicines      These medications were sent to [facility] [address] [place] [address] " [place], [place] - [address]   [address] , [facility] [address]     Phone:  [phone]     albuterol 108 (90 BASE) MCG/ACT Inhaler    azithromycin 250 MG tablet    budesonide 180 MCG/ACT inhaler                Primary Care Provider Office Phone # Fax #    Nicole Carbajal [phone]       [address] [address]        Equal Access to Services     ASPEN MASTERS : Hadii aad ku hadasho Soomaali, waaxda luqadaha, qaybta kaalmada adeegyada, waxay idiin hayaan adeeg kharash charley . So [facility] [phone].    ATENCIÓN: Si habla español, tiene a casarez disposición servicios gratuitos de asistencia lingüística. [facility] [phone].    We comply with applicable federal civil rights laws and [place] laws. We do not discriminate on the basis of race, color, national origin, age, disability, sex, sexual orientation, or gender identity.            Thank you!     Thank you for choosing [facility]  for your care. Our goal is always to provide you with excellent care. Hearing back from our patients is one way we can continue to improve our services. Please take a few minutes to complete the written survey that you may receive in the mail after your visit with us. Thank you!             Your Updated Medication List - Protect others around you: Learn how to safely use, store and throw away your medicines at www.disposemymeds.org.          This list is accurate as of: 10/25/17  4:22 PM.  Always use your most recent med list.                   Brand Name Dispense Instructions for use Diagnosis    albuterol 108 (90 BASE) MCG/ACT Inhaler    PROAIR HFA/PROVENTIL HFA/VENTOLIN HFA    1 Inhaler    Inhale 2 puffs into the lungs every 6 hours as needed for shortness of breath / dyspnea or wheezing    Bronchitis       aspirin 81 MG tablet      Take by mouth daily        azithromycin 250 MG tablet    ZITHROMAX    8 tablet    Two tablets first day, then one tablet daily for 6  days.    Bronchitis       B-12 100 MCG Tabs           budesonide 180 MCG/ACT inhaler    PULMICORT FLEXHALER    1 Inhaler    Inhale 2 puffs into the lungs 2 times daily    Bronchitis       COENZYME Q-10 PO      Take 50 mg by mouth daily        MULTI VITAMIN PO      Take  by mouth one time a day as needed.        PRILOSEC PO      20 mg        sertraline 50 MG tablet    ZOLOFT    135 tablet    Take 1.5 tablets (75 mg) by mouth daily Further refills after upcoming appointment on 4/29/16.    Moderate episode of recurrent major depressive disorder (H)       UNABLE TO FIND     30 each    Lidoderm patch q 12 hours as needed    Chronic midline low back pain with sciatica, sciatica laterality unspecified       VITAMIN C PO      daily.        VITAMIN D (CHOLECALCIFEROL) PO      Take 1,000 Units by mouth daily        ZINC PO      daily.

## 2017-10-25 NOTE — NURSING NOTE
"Chief Complaint   Patient presents with     Referral     Dr Cotton        Initial /88 (BP Location: Right arm, Patient Position: Chair, Cuff Size: Adult Regular)  Pulse 82  Temp 99.4  F (37.4  C) (Tympanic)  Resp 18  Ht 5' 1\" (1.549 m)  Wt 140 lb (63.5 kg)  SpO2 95%  BMI 26.45 kg/m2 Estimated body mass index is 26.45 kg/(m^2) as calculated from the following:    Height as of this encounter: 5' 1\" (1.549 m).    Weight as of this encounter: 140 lb (63.5 kg).  Medication Reconciliation: complete   Pamela M Lechevalier LPN      "

## 2017-10-26 ASSESSMENT — ANXIETY QUESTIONNAIRES: GAD7 TOTAL SCORE: 15

## 2017-11-20 ENCOUNTER — MYC MEDICAL ADVICE (OUTPATIENT)
Dept: ORTHOPEDICS | Facility: OTHER | Age: 57
End: 2017-11-20

## 2017-11-21 ENCOUNTER — TELEPHONE (OUTPATIENT)
Dept: ORTHOPEDICS | Facility: OTHER | Age: 57
End: 2017-11-21

## 2017-11-21 DIAGNOSIS — M25.511 ACUTE PAIN OF BOTH SHOULDERS: ICD-10-CM

## 2017-11-21 DIAGNOSIS — M25.531 BILATERAL WRIST PAIN: Primary | ICD-10-CM

## 2017-11-21 DIAGNOSIS — M25.532 BILATERAL WRIST PAIN: Primary | ICD-10-CM

## 2017-11-21 DIAGNOSIS — M25.512 ACUTE PAIN OF BOTH SHOULDERS: ICD-10-CM

## 2017-11-29 ENCOUNTER — TRANSFERRED RECORDS (OUTPATIENT)
Dept: HEALTH INFORMATION MANAGEMENT | Facility: HOSPITAL | Age: 57
End: 2017-11-29

## 2017-12-04 ENCOUNTER — MYC MEDICAL ADVICE (OUTPATIENT)
Dept: ORTHOPEDICS | Facility: OTHER | Age: 57
End: 2017-12-04

## 2017-12-05 ENCOUNTER — OFFICE VISIT (OUTPATIENT)
Dept: SLEEP MEDICINE | Facility: HOSPITAL | Age: 57
End: 2017-12-05
Attending: INTERNAL MEDICINE
Payer: COMMERCIAL

## 2017-12-05 VITALS
WEIGHT: 135 LBS | BODY MASS INDEX: 26.5 KG/M2 | DIASTOLIC BLOOD PRESSURE: 78 MMHG | SYSTOLIC BLOOD PRESSURE: 140 MMHG | OXYGEN SATURATION: 88 % | HEART RATE: 83 BPM | HEIGHT: 60 IN | RESPIRATION RATE: 12 BRPM

## 2017-12-05 DIAGNOSIS — G47.419 NARCOLEPSY WITHOUT CATAPLEXY(347.00): Primary | ICD-10-CM

## 2017-12-05 PROCEDURE — 99212 OFFICE O/P EST SF 10 MIN: CPT

## 2017-12-05 PROCEDURE — 99241 ZZC OFFICE CONSULTATION,LEVEL I: CPT | Performed by: INTERNAL MEDICINE

## 2017-12-05 NOTE — NURSING NOTE
Patient ID checked with name and date of birth. Reviewed allergies and home medications. Took Vitals and brief history.   Scheduled patient for an overnight sleep test and MSLT and reviewed the instructions with her she verbalized understanding

## 2017-12-05 NOTE — PROGRESS NOTES
56 y/o referred for chronic fatigue and sleepiness. Pt has had severe fatigue and sleepiness for most of her life. Her twin sister has 'sleep apnea and idiopathic hypersomnia' from a  past sleep study. Pt went to the [facility] 6 mo ago, received a dx of 'fibromyalgia and chronic multifactorial fatigue'. They recommend many books to read and various exercises, therapy programs which I don't think she understands of has followed thru with. She is focused on her sister who has done well with low dose Provigil, apparently she does not have a diagnosis that qualifies for use of this drug, her depression and chronic fatigue were unacceptable to her insurance company. She is not sure if she snores, sleeps alone. As above her normal weight sister apparently did have MARE and was tried on initially cpap for an index of 10, that was ineffective and a nap study was indeterminate, she ended up on low dose provigil which has been effective for her. She denies sleep paralysis, sleep hallucinations or cataplexy. She has a lot of chronic diffuse muscle pain attributed to her fibromyalgia. She has a hx of depression and was crying in my office today about how severe her fatigue and sleepiness. She says she can fall asleep lightning fast anytime she sits down. She recently quit her job as a , thought this might help her but it didn't.    PMH hx of ovarian CA and vulvar CA, chronic smoker and ? COPD    SH lives alone with her dogs, has a twin sister, retired     [place]  Lots of make up,big hair, smells strongly of smoke, miserable from her fatigue               Heent pharynx fairly narrow                          Resp clear                                 Cor  rrr o mgr      Current Outpatient Prescriptions:      COENZYME Q-10 PO, Take 50 mg by mouth daily, Disp: , Rfl:      albuterol (PROAIR HFA/PROVENTIL HFA/VENTOLIN HFA) 108 (90 BASE) MCG/ACT Inhaler, Inhale 2 puffs into the lungs every 6 hours as needed for  shortness of breath / dyspnea or wheezing, Disp: 1 Inhaler, Rfl: 0     budesonide (PULMICORT FLEXHALER) 180 MCG/ACT inhaler, Inhale 2 puffs into the lungs 2 times daily, Disp: 1 Inhaler, Rfl: 1     azithromycin (ZITHROMAX) 250 MG tablet, Two tablets first day, then one tablet daily for 6 days., Disp: 8 tablet, Rfl: 0     Multiple Vitamin (MULTI VITAMIN PO), Take  by mouth one time a day as needed., Disp: , Rfl:      Cyanocobalamin (B-12) 100 MCG TABS, , Disp: , Rfl:      aspirin 81 MG tablet, Take by mouth daily, Disp: , Rfl:      sertraline (ZOLOFT) 50 MG tablet, Take 1.5 tablets (75 mg) by mouth daily Further refills after upcoming appointment on 4/29/16., Disp: 135 tablet, Rfl: 1     UNABLE TO FIND, Lidoderm patch q 12 hours as needed, Disp: 30 each, Rfl: 3     VITAMIN D, CHOLECALCIFEROL, PO, Take 1,000 Units by mouth daily , Disp: , Rfl:      Omeprazole (PRILOSEC PO), 20 mg, Disp: , Rfl:      Ascorbic Acid (VITAMIN C PO), daily., Disp: , Rfl:      Multiple Vitamins-Minerals (ZINC PO), daily., Disp: , Rfl:      A/ Chronic fatigue, fibromyalgia    given her significant sleepiness and sisters history will proceed with a sleep and nap study with an eye towards possible provigil indication/trial if she meets criteria for narcolepsy/IH. Her psychiatric practitioner might consider an amphetamine based stimulant in the future as adjunct tx for her depression as it is much cheaper than provigil.

## 2017-12-05 NOTE — MR AVS SNAPSHOT
After Visit Summary   12/5/2017    Madeleine Allen    MRN: [id number]           Patient Information     Date Of Birth          1960        Visit Information        Provider Department      12/5/2017 3:00 PM Jason Baldwin MD [facility]        Today's Diagnoses     Narcolepsy without cataplexy(347.00)    -  1      Care Instructions    In order to help your stay at the Sleep Center to be as comfortable as possible and to obtain the best sleep study possible, the Sleep Center Staff has established the following guidelines:    1.  Please attempt to be here 15 minutes prior to your scheduled appointment time.  If you anticipate being late or you cannot make your overnight appointment, please call us at [phone] or call [phone] and ask for the Sleep Center.  PLEASE MAKE EVERY ATTEMPT TO MAKE YOUR APPOINTMENT.  A SLEEP TECHNICIAN AND A SLEEP ROOM HAS BEEN RESERVED JUST FOR YOU.    2. When you arrive at [facility], please park in the upper lot, by [address].  Enter the hospital at the Emergency Room Entrance.  Follow the signs to the Emergency Room Admitting Desk and tell them you are here for a sleep study in the Sleep Disorder Center.    3. Do not stop any medications, unless specifically requested.  Be sure to bring all medications that you need with you.    4. Do not use any hair creams or gels, moisturizers, rinses or sprays the day of your study.  FOR MALES:  if you are usually clean shaven, please shave before you come in; FOR FEMALES:  do not wear make-up or be prepared to remove it.  This will improve the quality of the study.    5. Please DO NOT USE CAFFEINE OR ALCOHOL after 2:00 PM the day of your test unless advised otherwise.    6. Do not take any naps the day of your test.    7. Attachment of monitoring equipment will take approximately one hour, including an explanation of the test.    8. Bring comfortable night clothes to sleep in, two-piece, cotton pajamas or  shorts are best.    9. If you have a pillow that you prefer using, please bring it with you; but do not forget to take it home with you in the morning.    10. Most of our studies are complete by approximately 7:00 AM.  In most instances we may wake you during your normal wake time or the time you request to be awakened.    If you have any special needs or questions related to this information or your test, please feel free to call the Sleep Disorder Center at [phone] or [phone] and ask for the Sleep Disorder Center.  Please make every effort to keep your appointment.  A sleep technician and a sleep room have been reserved just for you.  In the event that you are unable to make your appointment, please call as soon as possible to notify us of your cancellation.    Multiple Sleep Latency Test    The following is a brief description of the Multiple Sleep Latency Test (MSLT).  The test will be performed the day following your all-night sleep study.  You may be asked to stay for this testing if your nighttime test is normal.  This test is a procedure that monitors your daytime sleepiness.    After you have finished with you all-night sleep study, you will receive a complimentary breakfast.  This may be eaten in the lab in which you are tested.  A minimal amount of apparatus will remain attached to you after your nighttime study; therefore, you will not be able to shower in the morning.  You may shower after the daytime test.  We ask that you refrain from tobacco and caffeine drinks.    The initial procedure will begin 1-2 hours after you wake in the morning and will continue throughout the day at 2 hour intervals for a total of 5 naps.  Please be prepared to stay all day (testing usually ends between 4-5 PM).  Each interval will consist of a 20-30 minute nap at which we will be recording the same functions that were monitored during the night study.  The times given to you by the technicians are essential for the  study, so please be back in the Sleep Center at the appropriate times.    We encourage you to bring books, writing materials, or handicrafts to do between testing times.  Afternoon testing time you will receive complimentary lunch.  This may be eaten in the Lab.  Between each of your testing times, you may move about the hospital or choose to remain in the room.  If you choose to leave the testing area, we ask that you report back to the Lab 15 minutes before each test.    We hope that this has answered some of your questions and alleviated any anxiety you may have regarding the procedure.  Please do not hesitate to call should you have any questions concerning the MSLT procedure at [phone] or or [phone] and ask for the Sleep Center.    Please make every effort to keep your appointment.  A sleep technician and a sleep room have been reserved just for you.  In the event that you are unable to make your appointment, please call as soon as possible to notify us of your cancellation.            Follow-ups after your visit        Your next 10 appointments already scheduled     Dec 11, 2017  1:15 PM CST   (Arrive by 1:00 PM)   MR SHOULDER LEFT W/O CONTRAST with [address] MRI ([facility] )    [address] [place] [id number] [phone]           Take your medicines as usual, unless your doctor tells you not to. Bring a list of your current medicines to your exam (including vitamins, minerals and over-the-counter drugs). Also bring the results of similar scans you may have had.  Please remove any body piercings and hair extensions before you arrive.  Follow your doctor s orders. If you do not, we may have to postpone your exam.  You will not have contrast for this exam. You do not need to do anything special to prepare.  The MRI machine uses a strong magnet. Please wear clothes without metal (snaps, zippers). A sweatsuit works well, or we may give you a hospital gown.   **IMPORTANT**  THE INSTRUCTIONS BELOW ARE ONLY FOR THOSE PATIENTS WHO HAVE BEEN TOLD THEY WILL RECEIVE SEDATION OR GENERAL ANESTHESIA DURING THEIR MRI PROCEDURE:  IF YOU WILL RECEIVE SEDATION (take medicine to help you relax during your exam):   You must get the medicine from your doctor before you arrive. Bring the medicine to the exam. Do not take it at home.   Arrive one hour early. Bring someone who can take you home after the test. Your medicine will make you sleepy. After the exam, you may not drive, take a bus or take a taxi by yourself.   No eating 8 hours before your exam. You may have clear liquids up until 4 hours before your exam. (Clear liquids include water, clear tea, black coffee and fruit juice without pulp.)  IF YOU WILL RECEIVE ANESTHESIA (be asleep for your exam):   Arrive 1 1/2 hours early. Bring someone who can take you home after the test. You may not drive, take a bus or take a taxi by yourself.   No eating 8 hours before your exam. You may have clear liquids up until 4 hours before your exam. (Clear liquids include water, clear tea, black coffee and fruit juice without pulp.)   You will spend four to five hours in the recovery room.  Please call the Imaging Department at your exam site with any questions.            Dec 11, 2017  2:00 PM CST   (Arrive by 1:45 PM)   [facility] ([facility] )    [address] [place] [id number] [phone]           Take your medicines as usual, unless your doctor tells you not to. Bring a list of your current medicines to your exam (including vitamins, minerals and over-the-counter drugs). Also bring the results of similar scans you may have had.  Please remove any body piercings and hair extensions before you arrive.  Follow your doctor s orders. If you do not, we may have to postpone your exam.  You will not have contrast for this exam. You do not need to do anything special to prepare.  The MRI machine uses  a strong magnet. Please wear clothes without metal (snaps, zippers). A sweatsuit works well, or we may give you a hospital gown.   **IMPORTANT** THE INSTRUCTIONS BELOW ARE ONLY FOR THOSE PATIENTS WHO HAVE BEEN TOLD THEY WILL RECEIVE SEDATION OR GENERAL ANESTHESIA DURING THEIR MRI PROCEDURE:  IF YOU WILL RECEIVE SEDATION (take medicine to help you relax during your exam):   You must get the medicine from your doctor before you arrive. Bring the medicine to the exam. Do not take it at home.   Arrive one hour early. Bring someone who can take you home after the test. Your medicine will make you sleepy. After the exam, you may not drive, take a bus or take a taxi by yourself.   No eating 8 hours before your exam. You may have clear liquids up until 4 hours before your exam. (Clear liquids include water, clear tea, black coffee and fruit juice without pulp.)  IF YOU WILL RECEIVE ANESTHESIA (be asleep for your exam):   Arrive 1 1/2 hours early. Bring someone who can take you home after the test. You may not drive, take a bus or take a taxi by yourself.   No eating 8 hours before your exam. You may have clear liquids up until 4 hours before your exam. (Clear liquids include water, clear tea, black coffee and fruit juice without pulp.)   You will spend four to five hours in the recovery room.  Please call the Imaging Department at your exam site with any questions.              Future tests that were ordered for you today     Open Future Orders        Priority Expected Expires Ordered    Comprehensive Sleep Study Routine  6/3/2018 12/5/2017            Who to contact     If you have questions or need follow up information about today's clinic visit or your schedule please contact [facility] directly at [phone].  Normal or non-critical lab and imaging results will be communicated to you by MyChart, letter or phone within 4 business days after the clinic has received the results. If you do not hear from us within 7  days, please contact the clinic through "[employer] or phone. If you have a critical or abnormal lab result, we will notify you by phone as soon as possible.  Submit refill requests through "[employer] or call your pharmacy and they will forward the refill request to us. Please allow 3 business days for your refill to be completed.          Additional Information About Your Visit        [employer] Information     "[employer] gives you secure access to your electronic health record. If you see a primary care provider, you can also send messages to your care team and make appointments. If you have questions, please call your primary care clinic.  If you do not have a primary care provider, please call [phone] and they will assist you.        Care EveryWhere ID     This is your Care EveryWhere ID. This could be used by other organizations to access your [facility] medical records  [id number]        Your Vitals Were     Pulse Respirations Height Pulse Oximetry BMI (Body Mass Index)       83 12 5' (1.524 m) 88% 26.37 kg/m2        Blood Pressure from Last 3 Encounters:   12/05/17 140/78   10/25/17 152/88   10/17/17 128/82    Weight from Last 3 Encounters:   12/05/17 135 lb (61.2 kg)   10/25/17 140 lb (63.5 kg)   10/17/17 140 lb (63.5 kg)               Primary Care Provider Office Phone # Fax Jad Carbajal [phone] [address] [place]  [facility] [address]        Equal Access to Services     ASPEN MASTERS AH: Hadii yesika ku hadasho Soomaali, waaxda luqadaha, qaybta kaalmada adeegyada, waxay camilla corona. So [facility] [phone].    ATENCIÓN: Si habla español, tiene a casarez disposición servicios gratuitos de asistencia lingüística. Lljose angel al [phone].    We comply with applicable federal civil rights laws and [place] laws. We do not discriminate on the basis of race, color, national origin, age, disability, sex, sexual orientation, or gender identity.            Thank you!     Thank  you for choosing [facility]  for your care. Our goal is always to provide you with excellent care. Hearing back from our patients is one way we can continue to improve our services. Please take a few minutes to complete the written survey that you may receive in the mail after your visit with us. Thank you!             Your Updated Medication List - Protect others around you: Learn how to safely use, store and throw away your medicines at www.disposemymeds.org.          This list is accurate as of: 12/5/17  3:20 PM.  Always use your most recent med list.                   Brand Name Dispense Instructions for use Diagnosis    albuterol 108 (90 BASE) MCG/ACT Inhaler    PROAIR HFA/PROVENTIL HFA/VENTOLIN HFA    1 Inhaler    Inhale 2 puffs into the lungs every 6 hours as needed for shortness of breath / dyspnea or wheezing    Bronchitis       aspirin 81 MG tablet      Take by mouth daily        azithromycin 250 MG tablet    ZITHROMAX    8 tablet    Two tablets first day, then one tablet daily for 6 days.    Bronchitis       B-12 100 MCG Tabs           budesonide 180 MCG/ACT inhaler    PULMICORT FLEXHALER    1 Inhaler    Inhale 2 puffs into the lungs 2 times daily    Bronchitis       COENZYME Q-10 PO      Take 50 mg by mouth daily        MULTI VITAMIN PO      Take  by mouth one time a day as needed.        PRILOSEC PO      20 mg        sertraline 50 MG tablet    ZOLOFT    135 tablet    Take 1.5 tablets (75 mg) by mouth daily Further refills after upcoming appointment on 4/29/16.    Moderate episode of recurrent major depressive disorder (H)       UNABLE TO FIND     30 each    Lidoderm patch q 12 hours as needed    Chronic midline low back pain with sciatica, sciatica laterality unspecified       VITAMIN C PO      daily.        VITAMIN D (CHOLECALCIFEROL) PO      Take 1,000 Units by mouth daily        ZINC PO      daily.

## 2017-12-05 NOTE — PATIENT INSTRUCTIONS
In order to help your stay at the Sleep Center to be as comfortable as possible and to obtain the best sleep study possible, the Sleep Center Staff has established the following guidelines:    1.  Please attempt to be here 15 minutes prior to your scheduled appointment time.  If you anticipate being late or you cannot make your overnight appointment, please call us at [phone] or call [phone] and ask for the Sleep Center.  PLEASE MAKE EVERY ATTEMPT TO MAKE YOUR APPOINTMENT.  A SLEEP TECHNICIAN AND A SLEEP ROOM HAS BEEN RESERVED JUST FOR YOU.    2. When you arrive at [facility], please park in the upper lot, by [address].  Enter the hospital at the Emergency Room Entrance.  Follow the signs to the Emergency Room Admitting Desk and tell them you are here for a sleep study in the Sleep Disorder Center.    3. Do not stop any medications, unless specifically requested.  Be sure to bring all medications that you need with you.    4. Do not use any hair creams or gels, moisturizers, rinses or sprays the day of your study.  FOR MALES:  if you are usually clean shaven, please shave before you come in; FOR FEMALES:  do not wear make-up or be prepared to remove it.  This will improve the quality of the study.    5. Please DO NOT USE CAFFEINE OR ALCOHOL after 2:00 PM the day of your test unless advised otherwise.    6. Do not take any naps the day of your test.    7. Attachment of monitoring equipment will take approximately one hour, including an explanation of the test.    8. Bring comfortable night clothes to sleep in, two-piece, cotton pajamas or shorts are best.    9. If you have a pillow that you prefer using, please bring it with you; but do not forget to take it home with you in the morning.    10. Most of our studies are complete by approximately 7:00 AM.  In most instances we may wake you during your normal wake time or the time you request to be awakened.    If you have any special needs or  questions related to this information or your test, please feel free to call the Sleep Disorder Center at [phone] or [phone] and ask for the Sleep Disorder Center.  Please make every effort to keep your appointment.  A sleep technician and a sleep room have been reserved just for you.  In the event that you are unable to make your appointment, please call as soon as possible to notify us of your cancellation.    Multiple Sleep Latency Test    The following is a brief description of the Multiple Sleep Latency Test (MSLT).  The test will be performed the day following your all-night sleep study.  You may be asked to stay for this testing if your nighttime test is normal.  This test is a procedure that monitors your daytime sleepiness.    After you have finished with you all-night sleep study, you will receive a complimentary breakfast.  This may be eaten in the lab in which you are tested.  A minimal amount of apparatus will remain attached to you after your nighttime study; therefore, you will not be able to shower in the morning.  You may shower after the daytime test.  We ask that you refrain from tobacco and caffeine drinks.    The initial procedure will begin 1-2 hours after you wake in the morning and will continue throughout the day at 2 hour intervals for a total of 5 naps.  Please be prepared to stay all day (testing usually ends between 4-5 PM).  Each interval will consist of a 20-30 minute nap at which we will be recording the same functions that were monitored during the night study.  The times given to you by the technicians are essential for the study, so please be back in the Sleep Center at the appropriate times.    We encourage you to bring books, writing materials, or handicrafts to do between testing times.  Afternoon testing time you will receive complimentary lunch.  This may be eaten in the Lab.  Between each of your testing times, you may move about the hospital or choose to remain in the  room.  If you choose to leave the testing area, we ask that you report back to the Lab 15 minutes before each test.    We hope that this has answered some of your questions and alleviated any anxiety you may have regarding the procedure.  Please do not hesitate to call should you have any questions concerning the MSLT procedure at [phone] or or [phone] and ask for the Sleep Center.    Please make every effort to keep your appointment.  A sleep technician and a sleep room have been reserved just for you.  In the event that you are unable to make your appointment, please call as soon as possible to notify us of your cancellation.

## 2017-12-11 ENCOUNTER — HOSPITAL ENCOUNTER (OUTPATIENT)
Dept: MRI IMAGING | Facility: HOSPITAL | Age: 57
Discharge: HOME | End: 2017-12-11
Attending: ORTHOPAEDIC SURGERY | Admitting: ORTHOPAEDIC SURGERY
Payer: COMMERCIAL

## 2017-12-11 ENCOUNTER — HOSPITAL ENCOUNTER (OUTPATIENT)
Dept: MRI IMAGING | Facility: HOSPITAL | Age: 57
End: 2017-12-11
Attending: ORTHOPAEDIC SURGERY
Payer: COMMERCIAL

## 2017-12-11 DIAGNOSIS — M25.512 ACUTE PAIN OF BOTH SHOULDERS: ICD-10-CM

## 2017-12-11 DIAGNOSIS — M25.511 ACUTE PAIN OF BOTH SHOULDERS: ICD-10-CM

## 2017-12-11 PROCEDURE — 73221 MRI JOINT UPR EXTREM W/O DYE: CPT | Mod: TC,RT

## 2017-12-11 PROCEDURE — 73221 MRI JOINT UPR EXTREM W/O DYE: CPT | Mod: TC,LT

## 2017-12-13 ENCOUNTER — TRANSFERRED RECORDS (OUTPATIENT)
Dept: HEALTH INFORMATION MANAGEMENT | Facility: HOSPITAL | Age: 57
End: 2017-12-13

## 2017-12-27 ENCOUNTER — THERAPY VISIT (OUTPATIENT)
Dept: SLEEP MEDICINE | Facility: HOSPITAL | Age: 57
End: 2017-12-27
Attending: INTERNAL MEDICINE
Payer: COMMERCIAL

## 2017-12-27 DIAGNOSIS — G47.419 NARCOLEPSY WITHOUT CATAPLEXY(347.00): ICD-10-CM

## 2017-12-27 PROCEDURE — 95805 MULTIPLE SLEEP LATENCY TEST: CPT | Mod: 26 | Performed by: INTERNAL MEDICINE

## 2017-12-27 PROCEDURE — 95805 MULTIPLE SLEEP LATENCY TEST: CPT

## 2017-12-27 PROCEDURE — 95810 POLYSOM 6/> YRS 4/> PARAM: CPT | Mod: 26 | Performed by: INTERNAL MEDICINE

## 2017-12-27 PROCEDURE — 95810 POLYSOM 6/> YRS 4/> PARAM: CPT

## 2017-12-27 NOTE — LETTER
Madeleine Allen  [address] [place] [place] [address]    January 3, 2018       Dear Madeleine,                    I recently read your sleep and nap study. They were fairly normal, you don't appear to have sleep apnea or narcolepsy. Unfortunately this doesn't give us any new ideas on what to do about your fatigue.                                                                                        Sincerely,        Jason Baldwin MD, D,[facility]           [address] [place] [id number] [phone]

## 2017-12-27 NOTE — MR AVS SNAPSHOT
After Visit Summary   12/27/2017    Madeleine Allen    MRN: [id number]           Patient Information     Date Of Birth          1960        Visit Information        Provider Department      12/27/2017 7:30 PM [facility]        Today's Diagnoses     Narcolepsy without cataplexy(347.00)           Follow-ups after your visit        Your next 10 appointments already scheduled     Jan 08, 2018  3:20 PM CST   (Arrive by 3:05 PM)   Return Visit with Chad Monroy DO    ORTHOPEDICS ([facility] [place] )    [address]  [place] [address] [phone]              Who to contact     If you have questions or need follow up information about today's clinic visit or your schedule please contact [facility] directly at [phone].  Normal or non-critical lab and imaging results will be communicated to you by MyChart, letter or phone within 4 business days after the clinic has received the results. If you do not hear from us within 7 days, please contact the clinic through [email or web address] or phone. If you have a critical or abnormal lab result, we will notify you by phone as soon as possible.  Submit refill requests through [employer] or call your pharmacy and they will forward the refill request to us. Please allow 3 business days for your refill to be completed.          Additional Information About Your Visit        MyChart Information     [employer] gives you secure access to your electronic health record. If you see a primary care provider, you can also send messages to your care team and make appointments. If you have questions, please call your primary care clinic.  If you do not have a primary care provider, please call [phone] and they will assist you.        Care EveryWhere ID     This is your Care EveryWhere ID. This could be used by other organizations to access your [place] medical records  [id number]         Blood Pressure from Last 3 Encounters:   12/05/17  140/78   10/25/17 152/88   10/17/17 128/82    Weight from Last 3 Encounters:   12/05/17 135 lb (61.2 kg)   10/25/17 140 lb (63.5 kg)   10/17/17 140 lb (63.5 kg)              We Performed the Following     Comprehensive Sleep Study        Primary Care Provider Office Phone # Fax #    Nicole Carbajal [phone] [address]  [place] [address]        Equal Access to Services     ALYSE MASTERS : Hadii aad ku hadasho Soomaali, waaxda luqadaha, qaybta kaalmada adeegyada, waxay idiin hayaan david whitehead . So [facility] [phone].    ATENCIÓN: Si habla español, tiene a casarez disposición servicios gratuitos de asistencia lingüística. [facility] [phone].    We comply with applicable federal civil rights laws and [place] laws. We do not discriminate on the basis of race, color, national origin, age, disability, sex, sexual orientation, or gender identity.            Thank you!     Thank you for choosing [facility]  for your care. Our goal is always to provide you with excellent care. Hearing back from our patients is one way we can continue to improve our services. Please take a few minutes to complete the written survey that you may receive in the mail after your visit with us. Thank you!             Your Updated Medication List - Protect others around you: Learn how to safely use, store and throw away your medicines at www.disposemymeds.org.          This list is accurate as of: 12/27/17 11:59 PM.  Always use your most recent med list.                   Brand Name Dispense Instructions for use Diagnosis    albuterol 108 (90 BASE) MCG/ACT Inhaler    PROAIR HFA/PROVENTIL HFA/VENTOLIN HFA    1 Inhaler    Inhale 2 puffs into the lungs every 6 hours as needed for shortness of breath / dyspnea or wheezing    Bronchitis       aspirin 81 MG tablet      Take by mouth daily        azithromycin 250 MG tablet    ZITHROMAX    8 tablet    Two tablets first day, then one tablet daily for 6 days.     Bronchitis       B-12 100 MCG Tabs           budesonide 180 MCG/ACT inhaler    PULMICORT FLEXHALER    1 Inhaler    Inhale 2 puffs into the lungs 2 times daily    Bronchitis       COENZYME Q-10 PO      Take 50 mg by mouth daily        MULTI VITAMIN PO      Take  by mouth one time a day as needed.        PRILOSEC PO      20 mg        sertraline 50 MG tablet    ZOLOFT    135 tablet    Take 1.5 tablets (75 mg) by mouth daily Further refills after upcoming appointment on 4/29/16.    Moderate episode of recurrent major depressive disorder (H)       UNABLE TO FIND     30 each    Lidoderm patch q 12 hours as needed    Chronic midline low back pain with sciatica, sciatica laterality unspecified       VITAMIN C PO      daily.        VITAMIN D (CHOLECALCIFEROL) PO      Take 1,000 Units by mouth daily        ZINC PO      daily.

## 2017-12-28 NOTE — PROGRESS NOTES
The Pt was identified by name and  as well as wristband.  She is a 58 yo female with c/o EDS and fatigue.  Sleep testing and possible findings were discussed during hook up.  MARE and CPAP therapy were explained as she was fitted with a ResMed Airfit N20 small mask.  Sleep onset was normal.  She exhibits intermittent moderate to loud snoring.  She was unable to consolidate her first REM period d/t SDB.  There were hypopneas noted in REM sleep.  The SpO2 baseline in sleep was 93%.  The lowest SpO2 was 83% following apnea in REM at the end of the study. No PLMs or arrhythmias were noted.  CPAP was not attempted as she did not appear to meet criteria early enough. The preliminary results were discussed. She reports a fairly normal night of sleep.  She remains in the lab for MSLT. After reviewing the vendor choices, he prefers [facility] for her DME.

## 2018-01-03 NOTE — PROGRESS NOTES
58 y/o referred by Tracy Carbajal for excessive daytime somnolence.   Overnight 18 channel polysomnography was done 12/27/17, I reviewed the raw data in detail.Please see scanned sleep study for full results.Sleep efficiency was normal with a shortened sleep latency and a prolonged REM latency. Sleep architecture shows all stages seen in usual amounts for age.. Baseline oxyhemoglobin saturation was 93%. The ECG was monitored and no arrhythmias were seen. There were no significant periodic leg movements noted.              Technician noted mild snoring. We measured minimal sleep disordered breathing, the apnea hypopnea index was just 2, cpap was not indicated.                             Pt stayed for an MSLT, 5 naps acquired, sleep latency 11 min, no SOREMs.     Impression: Normal NPSG and MSLT.

## 2018-01-03 NOTE — PROGRESS NOTES
Patient is a 58 y/o female in with c/o fatigue and EDS.  Normal sleep latency with a prolonged REM latency noted.  Moderate to loud snoring with scattered arousals and hypopneas noted in REM stage.  Patient was not attempted on CPAP as it was felt that she did not meet criteria for titration.  Patient tolerated study well and will remain in lab for MSLT.  Patient c/o severe headache during naps #4 and 5 and it probably affected test results.  The snowplow was also very disruptive to patient.

## 2018-01-08 ENCOUNTER — OFFICE VISIT (OUTPATIENT)
Dept: ORTHOPEDICS | Facility: OTHER | Age: 58
End: 2018-01-08
Attending: ORTHOPAEDIC SURGERY
Payer: COMMERCIAL

## 2018-01-08 VITALS
HEART RATE: 79 BPM | WEIGHT: 135 LBS | DIASTOLIC BLOOD PRESSURE: 68 MMHG | BODY MASS INDEX: 25.49 KG/M2 | RESPIRATION RATE: 20 BRPM | HEIGHT: 61 IN | SYSTOLIC BLOOD PRESSURE: 118 MMHG | TEMPERATURE: 98.2 F | OXYGEN SATURATION: 100 %

## 2018-01-08 DIAGNOSIS — M25.531 BILATERAL WRIST PAIN: Primary | ICD-10-CM

## 2018-01-08 DIAGNOSIS — M25.511 ACUTE PAIN OF BOTH SHOULDERS: ICD-10-CM

## 2018-01-08 DIAGNOSIS — M25.512 ACUTE PAIN OF BOTH SHOULDERS: ICD-10-CM

## 2018-01-08 DIAGNOSIS — M25.532 BILATERAL WRIST PAIN: Primary | ICD-10-CM

## 2018-01-08 PROCEDURE — 99212 OFFICE O/P EST SF 10 MIN: CPT | Performed by: ORTHOPAEDIC SURGERY

## 2018-01-08 ASSESSMENT — PAIN SCALES - GENERAL: PAINLEVEL: SEVERE PAIN (7)

## 2018-01-08 NOTE — MR AVS SNAPSHOT
"              After Visit Summary   1/8/2018    Madeleine Allen    MRN: [id number]           Patient Information     Date Of Birth          1960        Visit Information        Provider Department      1/8/2018 3:20 PM Chad Monroy, DO  [facility]         Follow-ups after your visit        Who to contact     If you have questions or need follow up information about today's clinic visit or your schedule please contact  ORTH[facility]EDICS directly at [phone].  Normal or non-critical lab and imaging results will be communicated to you by Ayi Lailehart, letter or phone within 4 business days after the clinic has received the results. If you do not hear from us within 7 days, please contact the clinic through [employer] or phone. If you have a critical or abnormal lab result, we will notify you by phone as soon as possible.  Submit refill requests through [employer] or call your pharmacy and they will forward the refill request to us. Please allow 3 business days for your refill to be completed.          Additional Information About Your Visit        MyChart Information     [employer] gives you secure access to your electronic health record. If you see a primary care provider, you can also send messages to your care team and make appointments. If you have questions, please call your primary care clinic.  If you do not have a primary care provider, please call [phone] and they will assist you.        Care EveryWhere ID     This is your Care EveryWhere ID. This could be used by other organizations to access your [place] medical records  [id number]        Your Vitals Were     Pulse Temperature Respirations Height Pulse Oximetry BMI (Body Mass Index)    79 98.2  F (36.8  C) (Tympanic) 20 5' 1\" (1.549 m) 100% 25.51 kg/m2       Blood Pressure from Last 3 Encounters:   01/08/18 118/68   12/05/17 140/78   10/25/17 152/88    Weight from Last 3 Encounters:   01/08/18 135 lb (61.2 kg)   12/05/17 135 lb (61.2 kg)   10/25/17 140 lb " (63.5 kg)              Today, you had the following     No orders found for display         Today's Medication Changes          These changes are accurate as of: 1/8/18  4:43 PM.  If you have any questions, ask your nurse or doctor.               These medicines have changed or have updated prescriptions.        Dose/Directions    sertraline 50 MG tablet   Commonly known as:  ZOLOFT   This may have changed:    - how much to take  - additional instructions   Used for:  Moderate episode of recurrent major depressive disorder (H)        Dose:  75 mg   Take 1.5 tablets (75 mg) by mouth daily Further refills after upcoming appointment on 4/29/16.   Quantity:  135 tablet   Refills:  1                Primary Care Provider Office Phone # Fax #    Nicole Carbajal, HECTOR [phone] [address]  [place] [address]        Equal Access to Services     [facility] : Henry Aden, waadal duranqryan, qaybroni kaalmajo morton, zhang whitehead . So [facility] [phone].    ATENCIÓN: Si habla español, tiene a casarez disposición servicios gratuitos de asistencia lingüística. Alexandreame al [phone].    We comply with applicable federal civil rights laws and [place] laws. We do not discriminate on the basis of race, color, national origin, age, disability, sex, sexual orientation, or gender identity.            Thank you!     Thank you for choosing  [facility]  for your care. Our goal is always to provide you with excellent care. Hearing back from our patients is one way we can continue to improve our services. Please take a few minutes to complete the written survey that you may receive in the mail after your visit with us. Thank you!             Your Updated Medication List - Protect others around you: Learn how to safely use, store and throw away your medicines at www.disposemymeds.org.          This list is accurate as of: 1/8/18  4:43 PM.  Always use your most recent  med list.                   Brand Name Dispense Instructions for use Diagnosis    albuterol 108 (90 BASE) MCG/ACT Inhaler    PROAIR HFA/PROVENTIL HFA/VENTOLIN HFA    1 Inhaler    Inhale 2 puffs into the lungs every 6 hours as needed for shortness of breath / dyspnea or wheezing    Bronchitis       aspirin 81 MG tablet      Take by mouth daily        B-12 100 MCG Tabs           COENZYME Q-10 PO      Take 50 mg by mouth daily        LOSARTAN POTASSIUM PO      Take 25 mg by mouth daily        MULTI VITAMIN PO      Take  by mouth one time a day as needed.        sertraline 50 MG tablet    ZOLOFT    135 tablet    Take 1.5 tablets (75 mg) by mouth daily Further refills after upcoming appointment on 4/29/16.    Moderate episode of recurrent major depressive disorder (H)       UNABLE TO FIND     30 each    Lidoderm patch q 12 hours as needed    Chronic midline low back pain with sciatica, sciatica laterality unspecified       VITAMIN C PO      daily.        VITAMIN D (CHOLECALCIFEROL) PO      Take 1,000 Units by mouth daily        ZINC 15 PO      Take 1 tablet by mouth daily

## 2018-01-08 NOTE — NURSING NOTE
"Chief Complaint   Patient presents with     RECHECK     follow up results       Initial /68  Pulse 79  Temp 98.2  F (36.8  C) (Tympanic)  Resp 20  Ht 5' 1\" (1.549 m)  Wt 135 lb (61.2 kg)  SpO2 100%  BMI 25.51 kg/m2 Estimated body mass index is 25.51 kg/(m^2) as calculated from the following:    Height as of this encounter: 5' 1\" (1.549 m).    Weight as of this encounter: 135 lb (61.2 kg).  Medication Reconciliation: complete     GLADYS MARY      "

## 2018-01-08 NOTE — PROGRESS NOTES
Patient presents today for follow-up from her EMGs, these were obtained because of suspected polyneuropathy.  EMGs demonstrate no obvious neuropathy or nerve compression.  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  We discussed treatment options for this, she is in the middle of getting her worker comp claim approved and is having a very difficult time doing this.  She works for the [employer] and in my experience the pulsatile workout system can be extremely laborious and unresponsive.  She recently started transfer of care to a new physician, Dr. Quinonez, and he may have to get involved with a disability evaluation in the near future.  The meantime, she is considering surgical intervention, which will consist of arthroscopy of the right shoulder with acromioclavicular joint resection and carpal tunnel release on the right.  Once she has her insurance and financial situation sorted out she would like to follow up again and schedule the procedure.

## 2018-01-12 ENCOUNTER — MYC MEDICAL ADVICE (OUTPATIENT)
Dept: SLEEP MEDICINE | Facility: HOSPITAL | Age: 58
End: 2018-01-12

## 2018-01-23 DIAGNOSIS — R53.82 CHRONIC FATIGUE: Primary | ICD-10-CM

## 2018-01-23 RX ORDER — MODAFINIL 200 MG/1
200 TABLET ORAL DAILY
Qty: 30 TABLET | Refills: 0 | Status: SHIPPED | OUTPATIENT
Start: 2018-01-23

## 2018-01-23 NOTE — PROGRESS NOTES
She was very insistent on a trial of modafinil, will trial 200 mg daily for a month. No clear medical indication and could get expensive.

## 2018-01-25 ENCOUNTER — TELEPHONE (OUTPATIENT)
Dept: FAMILY MEDICINE | Facility: OTHER | Age: 58
End: 2018-01-25

## 2018-01-25 NOTE — TELEPHONE ENCOUNTER
Received a prior authorization request for Provigil 200mg. Contacted insurance company- received an approval from Nov 25,2017-Jan 25, 2019. Notified  pharmacy of approval as well as the patient. Patient is also eligible to receive a 90 day supply per insurance company.Sent request to scanning. Yeni Saunders LPN

## 2018-03-10 ENCOUNTER — HEALTH MAINTENANCE LETTER (OUTPATIENT)
Age: 58
End: 2018-03-10

## 2018-06-05 ENCOUNTER — TRANSFERRED RECORDS (OUTPATIENT)
Dept: HEALTH INFORMATION MANAGEMENT | Facility: CLINIC | Age: 58
End: 2018-06-05

## 2018-07-18 ENCOUNTER — TRANSFERRED RECORDS (OUTPATIENT)
Dept: HEALTH INFORMATION MANAGEMENT | Facility: CLINIC | Age: 58
End: 2018-07-18

## 2018-08-31 DIAGNOSIS — R53.83 FATIGUE: ICD-10-CM

## 2018-08-31 DIAGNOSIS — R00.2 PALPITATIONS: Primary | ICD-10-CM

## 2018-08-31 DIAGNOSIS — F17.210 CIGARETTE SMOKER: ICD-10-CM

## 2018-08-31 DIAGNOSIS — I10 BENIGN ESSENTIAL HYPERTENSION: ICD-10-CM

## 2018-10-29 DIAGNOSIS — F33.1 MODERATE EPISODE OF RECURRENT MAJOR DEPRESSIVE DISORDER (H): ICD-10-CM

## 2018-10-29 NOTE — LETTER
[facility]  [address] [place] [address]                        Madeleine Allen  [address] [place] [address]      October 29, 2018           Dear Madeleine Allen,    APPOINTMENT REMINDER:       Our record indicates that it is time for you to be seen for an office visit with ERIN Sutherland.  You may call our office at [phone] to schedule an appointment for an annual exam.  Please disregard this notice if you have already made an appointment.      Sincerely,    ERIN Sutherland    MRN: [id number]

## 2018-10-29 NOTE — TELEPHONE ENCOUNTER
Sertraline 50 mg Tablet      Last Written Prescription Date:  9/13/17  Last Fill Quantity: 135,   # refills: 1  Last Office Visit: 9/13/17  Future Office visit:       PATIENT TAKING DIFFERENTLY 75 MG

## 2019-02-06 ENCOUNTER — HOSPITAL ENCOUNTER (OUTPATIENT)
Dept: GENERAL RADIOLOGY | Facility: HOSPITAL | Age: 59
Discharge: HOME | End: 2019-02-06
Attending: NURSE PRACTITIONER | Admitting: NURSE PRACTITIONER
Payer: COMMERCIAL

## 2019-02-06 DIAGNOSIS — M51.369 DEGENERATION OF LUMBAR INTERVERTEBRAL DISC: ICD-10-CM

## 2019-02-06 PROCEDURE — 25000125 ZZHC RX 250: Performed by: RADIOLOGY

## 2019-02-06 PROCEDURE — 25000128 H RX IP 250 OP 636: Performed by: RADIOLOGY

## 2019-02-06 PROCEDURE — C1751 CATH, INF, PER/CENT/MIDLINE: HCPCS | Mod: TC

## 2019-02-06 PROCEDURE — 62323 NJX INTERLAMINAR LMBR/SAC: CPT | Mod: TC

## 2019-02-06 RX ORDER — LIDOCAINE HYDROCHLORIDE 10 MG/ML
INJECTION, SOLUTION EPIDURAL; INFILTRATION; INTRACAUDAL; PERINEURAL
Status: DISPENSED
Start: 2019-02-06 | End: 2019-02-06

## 2019-02-06 RX ORDER — IOPAMIDOL 612 MG/ML
15 INJECTION, SOLUTION INTRATHECAL ONCE
Status: COMPLETED | OUTPATIENT
Start: 2019-02-06 | End: 2019-02-06

## 2019-02-06 RX ORDER — METHYLPREDNISOLONE ACETATE 80 MG/ML
INJECTION, SUSPENSION INTRA-ARTICULAR; INTRALESIONAL; INTRAMUSCULAR; SOFT TISSUE
Status: DISPENSED
Start: 2019-02-06 | End: 2019-02-06

## 2019-02-06 RX ORDER — METHYLPREDNISOLONE ACETATE 80 MG/ML
80 INJECTION, SUSPENSION INTRA-ARTICULAR; INTRALESIONAL; INTRAMUSCULAR; SOFT TISSUE ONCE
Status: COMPLETED | OUTPATIENT
Start: 2019-02-06 | End: 2019-02-06

## 2019-02-06 RX ADMIN — IOPAMIDOL 3 ML: 612 INJECTION, SOLUTION INTRATHECAL at 11:16

## 2019-02-06 RX ADMIN — LIDOCAINE HYDROCHLORIDE 5 ML: 10 INJECTION, SOLUTION EPIDURAL; INFILTRATION; INTRACAUDAL at 11:16

## 2019-02-06 RX ADMIN — METHYLPREDNISOLONE ACETATE 80 MG: 80 INJECTION, SUSPENSION INTRA-ARTICULAR; INTRALESIONAL; INTRAMUSCULAR; SOFT TISSUE at 11:17

## 2019-02-06 NOTE — DISCHARGE INSTRUCTIONS
Cell number on file:    Telephone Information:   Mobile [phone]     Is it ok to leave a message at this number(s)? Yes    Dr Sánchez completed your procedure on 2/6/2019.    Current Pain Level (0-10 Scale): 8/10  Post Pain Level (0-10):  0/10    Radiology Discharge instructions for Steroid Injection    Activity Level:     Do not do any heavy activity or exercise for 24 hours.   Do not drive for 4 hours after your injection.  Diet:   Return to your normal diet.  Medications:   If you have stopped taking your Aspirin, Coumadin/Warfarin, Ibuprofen, or any   other blood thinner for this procedure you may resume in the morning unless   your primary care provider has given you other instructions.    Diabetics may see an increase in blood sugar after steroid injections. If you are concerned about your blood sugar, please contact your family doctor.    Site Care:  Remove the bandage and bathe or shower the morning after the procedure.      Please allow two weeks to experience improvement in your pain.  If you have any further issues, please contact your provider.    Call your Primary Care Provider if you have the following (if your primary care provider is not available please seek emergency care):   Nausea with vomiting   Severe headache   Drowsiness or confusion   Redness or drainage at the injection or puncture site   Temperature over 101 degrees F   Other concerns   Worsening back pain   Stiff neck

## 2019-02-06 NOTE — IP AVS SNAPSHOT
[address] [address]  Phone:  [phone]                                    After Visit Summary   2/6/2019    Madeleine Allen    MRN: [id number]           After Visit Summary Signature Page    I have received my discharge instructions, and my questions have been answered. I have discussed any challenges I see with this plan with the nurse or doctor.    ..........................................................................................................................................  Patient/Patient Representative Signature      ..........................................................................................................................................  Patient Representative Print Name and Relationship to Patient    ..................................................               ................................................  Date                                   Time    ..........................................................................................................................................  Reviewed by Signature/Title    ...................................................              ..............................................  Date                                               Time          22EPIC Rev 08/18

## 2019-02-20 ENCOUNTER — TELEPHONE (OUTPATIENT)
Dept: INTERVENTIONAL RADIOLOGY/VASCULAR | Facility: HOSPITAL | Age: 59
End: 2019-02-20

## 2019-02-20 NOTE — TELEPHONE ENCOUNTER
INJECTION POST CALL    Procedure: Epidural julio tl l4-5  Radiologist(s): Dr. Dane Sánchez  Date of Procedure:  2-6-19    The patient was not available by telephone. Message Left.       Ana Van

## 2019-11-04 ENCOUNTER — HEALTH MAINTENANCE LETTER (OUTPATIENT)
Age: 59
End: 2019-11-04

## 2019-12-11 ENCOUNTER — HOSPITAL ENCOUNTER (OUTPATIENT)
Dept: CARDIOLOGY | Facility: HOSPITAL | Age: 59
Discharge: HOME | End: 2019-12-11
Attending: NURSE PRACTITIONER | Admitting: NURSE PRACTITIONER
Payer: MEDICARE

## 2019-12-11 DIAGNOSIS — R07.9 CHEST PAIN: ICD-10-CM

## 2019-12-11 PROCEDURE — 93350 STRESS TTE ONLY: CPT | Mod: 26 | Performed by: INTERNAL MEDICINE

## 2019-12-11 PROCEDURE — 93325 DOPPLER ECHO COLOR FLOW MAPG: CPT | Mod: 26 | Performed by: INTERNAL MEDICINE

## 2019-12-11 PROCEDURE — 93016 CV STRESS TEST SUPVJ ONLY: CPT | Performed by: INTERNAL MEDICINE

## 2019-12-11 PROCEDURE — 93321 DOPPLER ECHO F-UP/LMTD STD: CPT | Mod: 26 | Performed by: INTERNAL MEDICINE

## 2019-12-11 PROCEDURE — 93018 CV STRESS TEST I&R ONLY: CPT | Performed by: INTERNAL MEDICINE

## 2019-12-11 PROCEDURE — 93350 STRESS TTE ONLY: CPT | Mod: TC

## 2020-02-16 ENCOUNTER — HEALTH MAINTENANCE LETTER (OUTPATIENT)
Age: 60
End: 2020-02-16

## 2020-11-10 NOTE — ED AVS SNAPSHOT
[facility]    [address] [address]    Phone:  [phone]                                       Madeleine Allen   MRN: [id number]    Department:  [facility]   Date of Visit:  9/2/2017           Patient Information     Date Of Birth          1960        Your diagnoses for this visit were:     Acute bronchitis, unspecified organism     Gastroesophageal reflux disease with esophagitis        You were seen by Dacia Sargent PA.      Follow-up Information     Follow up with Nicole Carbajal NP.    Specialties:  Family Practice, Psychiatry    Why:  If symptoms worsen    Contact information:    [facility]  [address] [address]  [phone]          Follow up with [facility].    Specialty:  EMERGENCY MEDICINE    Why:  if further concerns develop over the weekend    Contact information:    [address] [address] [phone]    Additional information:    From [place]: Take [address]. Turn left at [address]. Turn left at the first stoplight on [address]. At the first stop sign, take a right onto [address]. Take a left into the parking lot and continue through until you reach the North enterance of the building.       From [place]: Take [address]. Take the [address] ramp towards [place]. Turn left onto [address]. Take a slight right onto [address]. Turn left at the first stoplight on [address]. At the first stop sign, take a right onto [address]. Take a left into the parking lot and continue through until you reach the North enterance of the building.       From [place]: Take [address]. Take a right at [address]. At the first stop sign, take a right onto [address]. Take a left into the parking lot and continue through until you reach the North enterance of the building.       Discharge References/Attachments      ACID REFLUX, TIPS TO CONTROL (ENGLISH)    BRONCHITIS, ANTIOBIOTIC TREATMENT (ADULT) (ENGLISH)    GERD (ADULT) (ENGLISH)    GERD, CONTROLLING; LIFESTYLE CHANGES (ENGLISH)      Future Appointments        Provider Department Dept Phone Center    9/13/2017 9:45 AM Nicole Carbajal NP [facility] [phone] [facility]         Review of your medicines      START taking        Dose / Directions Last dose taken    amoxicillin-clavulanate 875-125 MG per tablet   Commonly known as:  AUGMENTIN   Dose:  1 tablet   Quantity:  20 tablet        Take 1 tablet by mouth 2 times daily   Refills:  0          Our records show that you are taking the medicines listed below. If these are incorrect, please call your family doctor or clinic.        Dose / Directions Last dose taken    PRILOSEC PO   Dose:  20 mg        20 mg   Refills:  0        sertraline 50 MG tablet   Commonly known as:  ZOLOFT   Dose:  75 mg   Quantity:  135 tablet        Take 1.5 tablets (75 mg) by mouth daily Further refills after upcoming appointment on 4/29/16.   Refills:  3        VITAMIN C PO        daily.   Refills:  0        VITAMIN D (CHOLECALCIFEROL) PO   Dose:  20568 Units        Take 10,000 Units by mouth   Refills:  0        ZINC PO        daily.   Refills:  0                Prescriptions were sent or printed at these locations (1 Prescription)                   [facility] [address] [place] [address] [address]    [address] [facility] [address]    Telephone:  [phone]   Fax:  [phone]   Hours:                  E-Prescribed (1 of 1)         amoxicillin-clavulanate (AUGMENTIN) 875-125 MG per tablet                Orders Needing Specimen Collection     None      Pending Results     No orders found from 8/31/2017 to 9/3/2017.            Pending Culture Results     No orders found from 8/31/2017 to 9/3/2017.            Thank you for choosing [facility]       Thank you for choosing [facility] for your care. Our goal is  always to provide you with excellent care. Hearing back from our patients is one way we can continue to improve our services. Please take a few minutes to complete the written survey that you may receive in the mail after you visit with us. Thank you!        [employer] Information     [employer] gives you secure access to your electronic health record. If you see a primary care provider, you can also send messages to your care team and make appointments. If you have questions, please call your primary care clinic.  If you do not have a primary care provider, please call [phone] and they will assist you.        Care EveryWhere ID     This is your Care EveryWhere ID. This could be used by other organizations to access your [facility] medical records  [id number]        Equal Access to Services     ASPEN MASTERS : Henry Aden, sailaja quintanilla, annabel morton, zhang corona. So [facility] [phone].    ATENCIÓN: Si habla español, tiene a casarez disposición servicios gratuitos de asistencia lingüística. Llame al [phone].    We comply with applicable federal civil rights laws and [place] laws. We do not discriminate on the basis of race, color, national origin, age, disability sex, sexual orientation or gender identity.            After Visit Summary       This is your record. Keep this with you and show to your community pharmacist(s) and doctor(s) at your next visit.                   No complaints

## 2020-11-22 ENCOUNTER — HEALTH MAINTENANCE LETTER (OUTPATIENT)
Age: 60
End: 2020-11-22

## 2021-04-04 ENCOUNTER — HEALTH MAINTENANCE LETTER (OUTPATIENT)
Age: 61
End: 2021-04-04

## 2021-09-18 ENCOUNTER — HEALTH MAINTENANCE LETTER (OUTPATIENT)
Age: 61
End: 2021-09-18

## 2021-09-19 ENCOUNTER — HEALTH MAINTENANCE LETTER (OUTPATIENT)
Age: 61
End: 2021-09-19

## 2021-11-13 ENCOUNTER — HEALTH MAINTENANCE LETTER (OUTPATIENT)
Age: 61
End: 2021-11-13

## 2022-01-08 ENCOUNTER — HEALTH MAINTENANCE LETTER (OUTPATIENT)
Age: 62
End: 2022-01-08

## 2022-04-30 ENCOUNTER — HEALTH MAINTENANCE LETTER (OUTPATIENT)
Age: 62
End: 2022-04-30

## 2022-11-20 ENCOUNTER — HEALTH MAINTENANCE LETTER (OUTPATIENT)
Age: 62
End: 2022-11-20

## 2023-04-15 ENCOUNTER — HEALTH MAINTENANCE LETTER (OUTPATIENT)
Age: 63
End: 2023-04-15

## 2023-06-02 ENCOUNTER — HEALTH MAINTENANCE LETTER (OUTPATIENT)
Age: 63
End: 2023-06-02

## 2023-11-09 DIAGNOSIS — Z12.11 COLON CANCER SCREENING: ICD-10-CM

## 2023-11-25 ENCOUNTER — HEALTH MAINTENANCE LETTER (OUTPATIENT)
Age: 63
End: 2023-11-25

## 2024-06-22 ENCOUNTER — HEALTH MAINTENANCE LETTER (OUTPATIENT)
Age: 64
End: 2024-06-22